# Patient Record
Sex: FEMALE | Race: WHITE | Employment: UNEMPLOYED | URBAN - METROPOLITAN AREA
[De-identification: names, ages, dates, MRNs, and addresses within clinical notes are randomized per-mention and may not be internally consistent; named-entity substitution may affect disease eponyms.]

---

## 2017-09-25 ENCOUNTER — APPOINTMENT (OUTPATIENT)
Dept: PHYSICAL THERAPY | Age: 37
End: 2017-09-25

## 2017-09-28 ENCOUNTER — APPOINTMENT (OUTPATIENT)
Dept: PHYSICAL THERAPY | Age: 37
End: 2017-09-28

## 2017-10-11 ENCOUNTER — HOSPITAL ENCOUNTER (OUTPATIENT)
Dept: PHYSICAL THERAPY | Age: 37
Discharge: HOME | End: 2017-10-11
Payer: MEDICAID

## 2017-10-11 PROCEDURE — 97140 MANUAL THERAPY 1/> REGIONS: CPT

## 2017-10-11 PROCEDURE — 97110 THERAPEUTIC EXERCISES: CPT

## 2017-10-11 PROCEDURE — 97162 PT EVAL MOD COMPLEX 30 MIN: CPT

## 2017-10-11 NOTE — PROGRESS NOTES
PT DAILY TREATMENT NOTE     Patient Name: Avi Cantu  Date:10/11/2017  : 1980  [x]  Patient  Verified  Payor: BLUE CROSS MEDICAID / Plan: [address] [place] [address] / Product Type: Managed Care Medicaid /    In time:2:45  Out time:3:35  Total Treatment Time (min): 50  Visit #: 1 of 16    Treatment Area: Low back pain [M54.5]    SUBJECTIVE  Pain Level (0-10 scale): 710  Any medication changes, allergies to medications, adverse drug reactions, diagnosis change, or new procedure performed?: [x] No    [] Yes (see summary sheet for update)  Subjective functional status/changes:   [] No changes reported  2013- fell down 20 feet down ladder onto concrete, fractured pelvic, and compression fractures of T-spine  X-rays, MRI, TLSO for 6 months, external fixators B wrists  Injections, PT for back, OT for wrists, no surgeries for back  C/o: B SI and B LBP (right> left), intermittent numbness/tingling in B feet (usually in PM when sitting)  Job: unemployed;  job set  Goals: \"I want to minimize the pain in order to go back to work. \"    OBJECTIVE    Modality rationale:    Min Type Additional Details    [] Estim:  []Unatt       []IFC  []Premod                        []Other:  []w/ice   []w/heat  Position:  Location:    [] Estim: []Att    []TENS instruct  []NMES                    []Other:  []w/US   []w/ice   []w/heat  Position:  Location:    []  Traction: [] Cervical       []Lumbar                       [] Prone          []Supine                       []Intermittent   []Continuous Lbs:  [] before manual  [] after manual    []  Ultrasound: []Continuous   [] Pulsed                           []1MHz   []3MHz W/cm2:  Location:    []  Iontophoresis with dexamethasone         Location: [] Take home patch   [] In clinic    []  Ice     []  heat  []  Ice massage  []  Laser   []  Anodyne Position:  Location:    []  Laser with stim  []  Other:  Position:  Location:    []  Vasopneumatic Device Pressure:       [] lo [] med [] hi   Temperature: [] lo [] med [] hi   [] Skin assessment post-treatment:  []intact []redness- no adverse reaction    []redness  adverse reaction:     30 min [x]Eval                  []Re-Eval       10 min Therapeutic Exercise:  [x] See flow sheet :  Added REIL and right roadkill position (to be performed every 3 hours while awake)   Rationale: decrease pain and radiculopathy to improve the patients ability to tolerate positions and normalize ADLs. min Therapeutic Activity:  []  See flow sheet :         min Neuromuscular Re-education:  []  See flow sheet :       10 min Manual Therapy:    METs to correct anteriorly rotated right innominate (successfully corrected)   Rationale: decrease pain and correct joint alignment and joint mechanics to tolerate positions and ADLs. min Gait Training:  ___ feet with ___ device on level surfaces with ___ level of assist   Rationale: With   [] TE   [] TA   [] neuro   [] other: Patient Education: [x] Review HEP    [] Progressed/Changed HEP based on:   [] positioning   [] body mechanics   [] transfers   [] heat/ice application    [] other:      Other Objective/Functional Measures:   See evaluation and plan of care. Pain Level (0-10 scale) post treatment: 5/10    ASSESSMENT/Changes in Function:    Pt reports LBP since falling from 20 feet high ladder in 2013, landing on buttocks causing fracture of pelvis (non-surgical), T-spine compression fractures (non-surgical), multilevel L-spine disk degeneration, and bilateral wrist fractures (treated by external fixators). During PT exam, pt displayed anteriorly rotated right innominate (successfully corrected using METs) and mechanical spine assessment showed a tentative extension bias directional preference and possibly a lateral compartment L-spine derangement.         Patient will continue to benefit from skilled PT services to modify and progress therapeutic interventions, address functional mobility deficits, address ROM deficits, address strength deficits, analyze and address soft tissue restrictions, analyze and cue movement patterns, analyze and modify body mechanics/ergonomics, assess and modify postural abnormalities and instruct in home and community integration to attain remaining goals. []  See Plan of Care  []  See progress note/recertification  []  See Discharge Summary         Progress towards goals / Updated goals:  Short Term Goals: To be accomplished in 3 weeks:    1) Goal: Pt's LBP will decrease to 5/10 at worst and remain at midline of spine in order to better tolerate all ADLs. Status at initial evaluation: pain 10/10 at worst  Current Status: not reassessed     2) Goal: Innominate alignment will remain symmetric and stable in order to decrease pain and improve tolerance for ambulation and ADLs. Status at initial evaluation: anteriorly rotated right innominate (corrected on 10/11/17)  Current Status: not reassessed     3) Goal: Bilateral LEs radicular symptoms (feet numbness) will be centralized to the low back in order to demonstrate effectiveness of directional preference exercises and decrease risk of LE dysfunction. Status at initial evaluation: intermittent bilateral feet numbness/tingling  Current Status: not reassessed     4) Goal: Pt will be independent and compliant with HEP to achieve other goals. Status at initial evaluation: pt is not independent with exercises  Current Status: not reassessed    Long Term Goals: To be accomplished in 8 weeks:    1) Goal: Abolish LBP and radicular symptoms in order to return to normal function. Status at initial evaluation: LBP 10/10 at worst with intermittent bilateral feet numbness/tingling  Current Status: not reassessed     2) Goal: Pt will be able to demonstrate ability to self manage LBP to 2/10 at worst during all activities in order to return to normal function.   Status at initial evaluation: LBP 10/10 at worst  Current Status: not reassessed     PLAN  []  Upgrade activities as tolerated     []  Continue plan of care  []  Update interventions per flow sheet       []  Discharge due to:_  [x]  Other: E-stim, MHP, core/back strengthening, stretches/ROM, METs, postural restoration (JOHANN) principles, [facility]) principles      Maricel RUSS PT 10/11/2017  3:52 PM    Future Appointments  Date Time Provider Rossy Mason   10/17/2017 10:30 AM Maricel RUSS PT [facility]   10/19/2017 11:00 AM [facility]HAEL [facility]

## 2017-10-11 NOTE — PROGRESS NOTES
[facility] in [address] [place] [address] [address]  Phone: [phone] Fax: [phone] of Care/ Statement of Necessity for Physical Therapy Services    Patient name: Marco Newman Start of Care: 10/11/2017   Referral source: Alisa Schmidt MD : 1980    Medical Diagnosis: Low back pain [M54.5] Onset Date: 13    Treatment Diagnosis: decreased positional and functional tolerance, pelvis obliquity/instability, LE radiculopathy, decreased core/back strength/stability   Prior Hospitalization: see medical history Provider#: [id number]   Medications: Verified on Patient summary List    Comorbidities: depression, T-spine compression fracture with Schmorl's nodes, OA, tobacco use   Prior Level of Function: no deficits, independent with ADLs      The Plan of Care and following information is based on the information from the initial evaluation. Assessment/ key information:    Pt reports LBP since falling from 20 feet high ladder in , landing on buttocks causing fracture of pelvis (non-surgical), T-spine compression fractures (non-surgical), multilevel L-spine disk degeneration, and bilateral wrist fractures (treated by external fixators). During PT exam, pt displayed anteriorly rotated right innominate (successfully corrected using METs) and mechanical spine assessment showed a tentative extension bias directional preference and possibly a lateral compartment L-spine derangement. Evaluation Complexity History MEDIUM  Complexity : 1-2 comorbidities / personal factors will impact the outcome/ POC ; Examination MEDIUM Complexity : 3 Standardized tests and measures addressing body structure, function, activity limitation and / or participation in recreation  ;Presentation MEDIUM Complexity : Evolving with changing characteristics  ; Clinical Decision Making MEDIUM Complexity : FOTO score of 26-74  Overall Complexity Rating: MEDIUM  Problem List: pain affecting function, decrease ROM, decrease strength, decrease ADL/ functional abilitiies, decrease activity tolerance and decrease flexibility/ joint mobility   Treatment Plan may include any combination of the following: Therapeutic exercise, Therapeutic activities, Neuromuscular re-education, Physical agent/modality, Manual therapy, Aquatic therapy and Patient education  Patient / Family readiness to learn indicated by: asking questions, trying to perform skills and interest  Persons(s) to be included in education: patient (P)  Barriers to Learning/Limitations: None  Patient Goal (s): \"I want to minimize the pain in order to go back to work. \"  Patient Self Reported Health Status: good  Rehabilitation Potential: good    Short Term Goals: To be accomplished in 3 weeks:    1) Goal: Pt's LBP will decrease to 5/10 at worst and remain at midline of spine in order to better tolerate all ADLs. Status at initial evaluation: pain 10/10 at worst   2) Goal: Innominate alignment will remain symmetric and stable in order to decrease pain and improve tolerance for ambulation and ADLs. Status at initial evaluation: anteriorly rotated right innominate (corrected on 10/11/17)   3) Goal: Bilateral LEs radicular symptoms (feet numbness) will be centralized to the low back in order to demonstrate effectiveness of directional preference exercises and decrease risk of LE dysfunction. Status at initial evaluation: intermittent bilateral feet numbness/tingling   4) Goal: Pt will be independent and compliant with HEP to achieve other goals. Status at initial evaluation: pt is not independent with exercises    Long Term Goals: To be accomplished in 8 weeks:    1) Goal: Abolish LBP and radicular symptoms in order to return to normal function.   Status at initial evaluation: LBP 10/10 at worst with intermittent bilateral feet numbness/tingling   2) Goal: Pt will be able to demonstrate ability to self manage LBP to 2/10 at worst during all activities in order to return to normal function. Status at initial evaluation: LBP 10/10 at worst    Frequency / Duration: Patient to be seen 2 times per week for 8 weeks. Patient/ Caregiver education and instruction: Diagnosis, prognosis, self care, activity modification and exercises, plan of care   [x]  Plan of care has been reviewed with STEFANI De Leon, PT 10/11/2017 3:51 PM    ________________________________________________________________________    I certify that the above Therapy Services are being furnished while the patient is under my care. I agree with the treatment plan and certify that this therapy is necessary. [de-identified] Signature:____________________  Date:____________Time: _________    Please sign and return to [facility] at [facility].  Lunakartik Correa [address]  Phone: [phone] Fax: [phone]

## 2017-10-11 NOTE — PROGRESS NOTES
Physical Therapy Evaluation - Lumbar Spine ([facility])  7/10  June 20, 2013- fell down 20 feet down ladder onto concrete, fractured pelvic, and compression fractures of T-spine  X-rays, MRI, TLSO for 6 months, external fixators B wrists  Injections, PT for back, OT for wrists, no surgeries for back  C/o: B SI and B LBP (right> left), intermittent numbness/tingling in B feet (usually in PM when sitting)  Job: unemployed;  job set  Goals: \"I want to minimize the pain in order to go back to work. \"    SUBJECTIVE  Chief Complaint:    Mechanism of injury:    Symptoms:  Pain rating (0-10): Today:    Best:    Worst:    [] Contstant:    [] Intermittent:     Aggravated by:   [] Bending [] Sitting [] Standing [] Walking   [] Moving [] Cough [] Sneeze [] Valsalva   [] AM  [] PM  Lying:  [] sup   [] pro   [] sidelying   [] Other:     Eased by:    [] Bending [] Sitting [] Standing [] Walking   [] Moving [] AM  [] PM  Lying: [] sup  [] pro  [] sidelying   [] Other:     General Health:  Red Flags Indicated? [] Yes    [] No  [] Yes [] No Recent weight change (If yes, due to dieting?  [] Yes  [] No)   [] Yes [] No Weakness in legs during walking  [] Yes [] No Unremitting pain at night  [] Yes [] No Abdominal pain or problems  [] Yes [] No Rectal bleeding  [] Yes [] No Feet more cold or painful in cold weather  [] Yes [] No Menstrual irregularities  [] Yes [] No Blood or pain with urination  [] Yes [] No Dysfunction of bowel or bladder  [] Yes [] No Recent illness within past 3 weeks (i.e, cold, flu)  [] Yes [] No Numbness/tingling in buttock/genitalia region    Past History/Treatments:     Diagnostic Tests: [] Lab work [] X-rays    [] CT [] MRI     [] Other:  Results:    Functional Status  Prior level of function:  Present functional limitations:  What position do you sleep in?:    OBJECTIVE  Posture:  Lateral Shift: [] R    [] L     [] +  [] -  Kyphosis: [] Increased [] Decreased   []  WNL  Lordosis:  [] Increased [] Decreased   [] WNL  Pelvic symmetry: [] WNL    [] Other:    Gait:  [] Normal     [] Abnormal:    Active Movements: [] N/A   [] Too acute   [] Other:  ROM % AROM % PROM Comments:pain, area   Forward flexion 40-60      Extension 20-30      SB right 20-30      SB left 20-30      Rotation right 5-10      Rotation left 5-10        Repeated Movements   Effects on present pain: produces (TX), abolishes (A), increases (incr), decreases (decr), centralizes (C), peripheral (PH), no effect (NE)   Pre-Test Sx Flexion Repeated Flexion Extension Repeated Extension Repeated SBL Repeated SBR   Sitting 1) LBP (6/10)  4) x10 reps: decreased LBP (5/10)       Standing 2) decreased LBP (5/10)    3) x10 reps: increased LBP (6/10)     Lying 5) supine in hook lying x1 minute: decreased LBP (4/10)  7) prone x1 minute:  decreased LBP (4/10)  6) SKTC x10 reps: increased LBP (5/10) 8) RUTH x1 minute: NE (4/10) 9) x10 reps: increased midline LBP (6/10)    10) right roadkill position: decreased midline LBP to 5/10   Comments:  Side Glide:  Sustained passive positioning test:    Neuro Screen [] WNL  Myotome/Dermatome/Reflexes:  Comments:    Dural Mobility:  SLR Sitting: [] R    [] L    [] +    [] -  @ (degrees):           Supine: [] R    [] L    [] +    [] -  @ (degrees):   Slump Test: [] R    [] L    [] +    [] -  @ (degrees):   Prone Knee Bend: [] R    [] L    [] +    [] -     Palpation  [] Min  [] Mod  [] Severe    Location:  [] Min  [] Mod  [] Severe    Location:  [] Min  [] Mod  [] Severe    Location:    Stabilization Tests  Multifidus Test  Level 1: Prone abdominal draw in (Goal 6-10mmHG):  Level 2: Supported leg load supine (needle deflection at 40mmHG): [] Yes  [] No   Level 3: Unsupported leg load supine (needle deflection at 40mmHG): [] Yes  [] No     Strength   L(0-5) R (0-5) N/T   Hip Flexion (L1,2)   []   Knee Extension (L3,4)   []   Ankle Dorsiflexion (L4)   []   Great Toe Extension (L5)   []   Ankle Plantarflexion (S1)   []   Knee Flexion (S1,2)   []   Upper Abdominals   []   Lower Abdominals   []   Paraspinals   []   Back Rotators   []   Gluteus Marcellus   []   Other   []     Special Tests  Lumbar:  Lumb. Compression: [] Pos  [] Neg               Lumbar Distraction:   [] Pos  [] Neg    Quadrant:  [] Pos  [] Neg   [] Flex  [] Ext    Sacroilliac:  Gaenslen's: [] R    [] L    [] +    [] -     Compression: [] +    [] -     Gapping:  [] +    [] -     Thigh Thrust: [] R    [] L    [] +    [] -     Leg Length: [] +    [] -   Position:    Crests:    ASIS:    PSIS:    Sacral Sulcus:    Mobility: Standing flex:     Sitting flex:     Supine to sit:     Prone knee bend:         Hip: Phong Grieve:  [] R    [] L    [] +    [] -     Scour:  [] R    [] L    [] +    [] -     Piriformis: [] R    [] L    [] +    [] -          Deficits: Rafal's: [] R    [] L    [] +    [] -     Gareth: [] R    [] L    [] +    [] -     Hamstrings 90/90:    Gastrocsoleus (to neutral): Right: Left:       Global Muscular Weakness:  Abdominals:  Quadratus Lumborum:  Paraspinals:   Other:    Other tests/comments:  right LE shortens in sitting- anteriorly rotated right innominate  right standing flexion test

## 2017-10-17 ENCOUNTER — HOSPITAL ENCOUNTER (OUTPATIENT)
Dept: PHYSICAL THERAPY | Age: 37
Discharge: HOME | End: 2017-10-17
Payer: MEDICAID

## 2017-10-17 PROCEDURE — 97112 NEUROMUSCULAR REEDUCATION: CPT

## 2017-10-17 PROCEDURE — 97140 MANUAL THERAPY 1/> REGIONS: CPT

## 2017-10-17 PROCEDURE — 97110 THERAPEUTIC EXERCISES: CPT

## 2017-10-17 NOTE — PROGRESS NOTES
PT DAILY TREATMENT NOTE     Patient Name: Cassy Cason  Date:10/17/2017  : 1980  [x]  Patient  Verified  Payor: BLUE CROSS MEDICAID / Plan: [facility] [employer] / Product Type: Managed Care Medicaid /    In time:10:42  Out time:11:45  Total Treatment Time (min): 63  Visit #: 2 of 16    Treatment Area: Low back pain [M54.5]    SUBJECTIVE  Pain Level (0-10 scale): 7/10  Any medication changes, allergies to medications, adverse drug reactions, diagnosis change, or new procedure performed?: [x] No    [] Yes (see summary sheet for update)  Subjective functional status/changes:   [] No changes reported  \"I did the exercises 2 times a day and sometimes it felt like the exercises made it feel more irrritated. \"    OBJECTIVE    Modality rationale:    Min Type Additional Details    [] Estim:  []Unatt       []IFC  []Premod                        []Other:  []w/ice   []w/heat  Position:  Location:    [] Estim: []Att    []TENS instruct  []NMES                    []Other:  []w/US   []w/ice   []w/heat  Position:  Location:    []  Traction: [] Cervical       []Lumbar                       [] Prone          []Supine                       []Intermittent   []Continuous Lbs:  [] before manual  [] after manual    []  Ultrasound: []Continuous   [] Pulsed                           []1MHz   []3MHz W/cm2:  Location:    []  Iontophoresis with dexamethasone         Location: [] Take home patch   [] In clinic    []  Ice     []  heat  []  Ice massage  []  Laser   []  Anodyne Position:  Location:    []  Laser with stim  []  Other:  Position:  Location:    []  Vasopneumatic Device Pressure:       [] lo [] med [] hi   Temperature: [] lo [] med [] hi   [] Skin assessment post-treatment:  []intact []redness- no adverse reaction    []redness  adverse reaction:      min []Eval                  []Re-Eval       12 min Therapeutic Exercise:  [x] See flow sheet :   Rationale: decrease pain to improve the patients ability to tolerate positions and ADLs. min Therapeutic Activity:  []  See flow sheet :        31 min Neuromuscular Re-education:  [x]  See flow sheet :  Added transverse abdominus (TA) bracing with Swissball #1, #2, #3, added glutes sets, bridges with adduction, prone heel presses   Rationale: increase strength, improve coordination and increase proprioception  to improve the patients ability to tolerate positions and ADLs. 20 min Manual Therapy:    METs to correct posteriorly rotated right innominate (successful), trigger point release right transverse abdominus muscle at anterior iliac crest   Rationale: decrease pain and correct joint alignment and joint mechanics to tolerate positions and ADLs. min Gait Training:  ___ feet with ___ device on level surfaces with ___ level of assist   Rationale: With   [] TE   [] TA   [] neuro   [] other: Patient Education: [x] Review HEP    [] Progressed/Changed HEP based on:   [] positioning   [] body mechanics   [] transfers   [] heat/ice application    [] other:      Other Objective/Functional Measures:   left LE longer in supine and shortens in sitting- posteriorly rotated right innominate  Right standing flexion test     Pain Level (0-10 scale) post treatment: 4/10    ASSESSMENT/Changes in Function:    Pt reported right anterior abdominal pain during transverse abdominus bracing, but trigger point release decreased pain. Right innominate is unstable and required correction for posteriorly rotated mal-alignment today verses anteriorly rotated innominate at visit #1. Exercises that contacted glutes increased right posterior iliac crest pain during active contraction. Extension bias directional preference exercises help relieve pain, but not lower than 4-5/10.     Patient will continue to benefit from skilled PT services to modify and progress therapeutic interventions, address functional mobility deficits, address ROM deficits, address strength deficits, analyze and address soft tissue restrictions, analyze and cue movement patterns, analyze and modify body mechanics/ergonomics, assess and modify postural abnormalities and instruct in home and community integration to attain remaining goals.      []  See Plan of Care  []  See progress note/recertification  []  See Discharge Summary      Progress towards goals / Updated goals:  Short Term Goals: To be accomplished in 3 weeks:                         1) Goal: Pt's LBP will decrease to 5/10 at worst and remain at midline of spine in order to better tolerate all ADLs. Status at initial evaluation: pain 10/10 at worst  Current Status: not reassessed                           2) Goal: Innominate alignment will remain symmetric and stable in order to decrease pain and improve tolerance for ambulation and ADLs. Status at initial evaluation: anteriorly rotated right innominate (corrected on 10/11/17)  Current Status: not reassessed                           3) Goal: Bilateral LEs radicular symptoms (feet numbness) will be centralized to the low back in order to demonstrate effectiveness of directional preference exercises and decrease risk of LE dysfunction. Status at initial evaluation: intermittent bilateral feet numbness/tingling  Current Status: not reassessed                           4) Goal: Pt will be independent and compliant with HEP to achieve other goals. Status at initial evaluation: pt is not independent with exercises  Current Status: not reassessed     Long Term Goals: To be accomplished in 8 weeks:                         1) Goal: Abolish LBP and radicular symptoms in order to return to normal function. Status at initial evaluation: LBP 10/10 at worst with intermittent bilateral feet numbness/tingling  Current Status: not reassessed                           2) Goal: Pt will be able to demonstrate ability to self manage LBP to 2/10 at worst during all activities in order to return to normal function.   Status at initial evaluation: LBP 10/10 at worst  Current Status: not reassessed     PLAN  []  Upgrade activities as tolerated     []  Continue plan of care  []  Update interventions per flow sheet       []  Discharge due to:_  []  Other:_      Nik Mckeon PT 10/17/2017  10:49 AM    Future Appointments  Date Time Provider Rossy Mason   10/19/2017 11:00 AM Nik Mckeon PT [facility]

## 2017-10-19 ENCOUNTER — HOSPITAL ENCOUNTER (OUTPATIENT)
Dept: PHYSICAL THERAPY | Age: 37
Discharge: HOME | End: 2017-10-19
Payer: MEDICAID

## 2017-10-19 PROCEDURE — 97112 NEUROMUSCULAR REEDUCATION: CPT

## 2017-10-19 PROCEDURE — 97110 THERAPEUTIC EXERCISES: CPT

## 2017-10-19 NOTE — PROGRESS NOTES
PT DAILY TREATMENT NOTE     Patient Name: Scout Pederson  Date:10/19/2017  : 1980  [x]  Patient  Verified  Payor: BLUE CROSS MEDICAID / Plan: [facility] HEALTHKEEPERS PLUS / Product Type: Managed Care Medicaid /    In time:11:10  Out time:12:04  Total Treatment Time (min): 47  Visit #: 3 of 16    Treatment Area: Low back pain [M54.5]    SUBJECTIVE  Pain Level (0-10 scale): 5/10  Any medication changes, allergies to medications, adverse drug reactions, diagnosis change, or new procedure performed?: [x] No    [] Yes (see summary sheet for update)  Subjective functional status/changes:   [] No changes reported  \"Pain 8/10 at worst after cutting grass. \"    OBJECTIVE    Modality rationale:    Min Type Additional Details    [] Estim:  []Unatt       []IFC  []Premod                        []Other:  []w/ice   []w/heat  Position:  Location:    [] Estim: []Att    []TENS instruct  []NMES                    []Other:  []w/US   []w/ice   []w/heat  Position:  Location:    []  Traction: [] Cervical       []Lumbar                       [] Prone          []Supine                       []Intermittent   []Continuous Lbs:  [] before manual  [] after manual    []  Ultrasound: []Continuous   [] Pulsed                           []1MHz   []3MHz W/cm2:  Location:    []  Iontophoresis with dexamethasone         Location: [] Take home patch   [] In clinic    []  Ice     []  heat  []  Ice massage  []  Laser   []  Anodyne Position:  Location:    []  Laser with stim  []  Other:  Position:  Location:    []  Vasopneumatic Device Pressure:       [] lo [] med [] hi   Temperature: [] lo [] med [] hi   [] Skin assessment post-treatment:  []intact []redness- no adverse reaction    []redness  adverse reaction:      min []Eval                  []Re-Eval       8 min Therapeutic Exercise:  [x] See flow sheet :   Rationale: decrease pain and radiculopathy to improve the patients ability to tolerate positions and normalize ADLs.      min Therapeutic Activity:  []  See flow sheet :        46 min Neuromuscular Re-education:  [x]  See flow sheet :  Added 90-90 hip lift (JOHANN)   Rationale: increase strength, improve coordination and increase proprioception  to improve the patients ability to tolerate positions and ADLs. min Manual Therapy:          min Gait Training:  ___ feet with ___ device on level surfaces with ___ level of assist   Rationale: With   [] TE   [] TA   [] neuro   [] other: Patient Education: [x] Review HEP    [] Progressed/Changed HEP based on:   [] positioning   [] body mechanics   [] transfers   [] heat/ice application    [] other:      Other Objective/Functional Measures:   Innominates aligned     Pain Level (0-10 scale) post treatment: 4/10    ASSESSMENT/Changes in Function:    Innominates remaining aligned and stable. Pain has decreased to the lowest level to date. Patient will continue to benefit from skilled PT services to modify and progress therapeutic interventions, address functional mobility deficits, address ROM deficits, address strength deficits, analyze and address soft tissue restrictions, analyze and cue movement patterns, analyze and modify body mechanics/ergonomics, assess and modify postural abnormalities and instruct in home and community integration to attain remaining goals.      []  See Plan of Care  []  See progress note/recertification  []  See Discharge Summary      Progress towards goals / Updated goals:  Short Term Goals: To be accomplished in 3 weeks:                         1) Goal: Pt's LBP will decrease to 5/10 at worst and remain at midline of spine in order to better tolerate all ADLs. Status at initial evaluation: pain 10/10 at worst  Current Status: not reassessed                            2) Goal: Innominate alignment will remain symmetric and stable in order to decrease pain and improve tolerance for ambulation and ADLs.   Status at initial evaluation: anteriorly rotated right innominate (corrected on 10/11/17)  Current Status: met (innominates aligned ) 10/19/17                            3) Goal: Bilateral LEs radicular symptoms (feet numbness) will be centralized to the low back in order to demonstrate effectiveness of directional preference exercises and decrease risk of LE dysfunction. Status at initial evaluation: intermittent bilateral feet numbness/tingling  Current Status: not reassessed                            4) Goal: Pt will be independent and compliant with HEP to achieve other goals. Status at initial evaluation: pt is not independent with exercises  Current Status: not reassessed      Long Term Goals: To be accomplished in 8 weeks:                         1) Goal: Abolish LBP and radicular symptoms in order to return to normal function. Status at initial evaluation: LBP 10/10 at worst with intermittent bilateral feet numbness/tingling  Current Status: not reassessed                            2) Goal: Pt will be able to demonstrate ability to self manage LBP to 2/10 at worst during all activities in order to return to normal function. Status at initial evaluation: LBP 10/10 at worst  Current Status: not reassessed     PLAN  []  Upgrade activities as tolerated     [x]  Continue plan of care  []  Update interventions per flow sheet       []  Discharge due to:_  []  Other:_      Kaleigh Vazquez V, PT 10/19/2017  11:14 AM    No future appointments.

## 2017-10-24 ENCOUNTER — APPOINTMENT (OUTPATIENT)
Dept: PHYSICAL THERAPY | Age: 37
End: 2017-10-24
Payer: MEDICAID

## 2017-10-26 ENCOUNTER — HOSPITAL ENCOUNTER (OUTPATIENT)
Dept: PHYSICAL THERAPY | Age: 37
Discharge: HOME | End: 2017-10-26
Payer: MEDICAID

## 2017-10-26 PROCEDURE — 97112 NEUROMUSCULAR REEDUCATION: CPT

## 2017-10-26 PROCEDURE — 97110 THERAPEUTIC EXERCISES: CPT

## 2017-10-26 NOTE — PROGRESS NOTES
PT DAILY TREATMENT NOTE     Patient Name: Luigi Client  Date:10/26/2017  : 1980  [x]  Patient  Verified  Payor: BLUE CROSS MEDICAID / Plan: [facility] HEALTHKEEPERS PLUS / Product Type: Managed Care Medicaid /    In time:9:38  Out time:10:16  Total Treatment Time (min): 38  Visit #: 4 of 16    Treatment Area: Low back pain [M54.5]    SUBJECTIVE  Pain Level (0-10 scale): 5/10  Any medication changes, allergies to medications, adverse drug reactions, diagnosis change, or new procedure performed?: [x] No    [] Yes (see summary sheet for update)  Subjective functional status/changes:   [] No changes reported  \"I definitely feel PT is helping because my pain is not as high as it used to be. \"    OBJECTIVE    Modality rationale:    Min Type Additional Details    [] Estim:  []Unatt       []IFC  []Premod                        []Other:  []w/ice   []w/heat  Position:  Location:    [] Estim: []Att    []TENS instruct  []NMES                    []Other:  []w/US   []w/ice   []w/heat  Position:  Location:    []  Traction: [] Cervical       []Lumbar                       [] Prone          []Supine                       []Intermittent   []Continuous Lbs:  [] before manual  [] after manual    []  Ultrasound: []Continuous   [] Pulsed                           []1MHz   []3MHz W/cm2:  Location:    []  Iontophoresis with dexamethasone         Location: [] Take home patch   [] In clinic    []  Ice     []  heat  []  Ice massage  []  Laser   []  Anodyne Position:  Location:    []  Laser with stim  []  Other:  Position:  Location:    []  Vasopneumatic Device Pressure:       [] lo [] med [] hi   Temperature: [] lo [] med [] hi   [] Skin assessment post-treatment:  []intact []redness- no adverse reaction    []redness  adverse reaction:      min []Eval                  []Re-Eval       9 min Therapeutic Exercise:  [x] See flow sheet :   Rationale: decrease pain and radiculopathy to improve the patients ability to tolerate positions and normalize ADLs. min Therapeutic Activity:  []  See flow sheet :        29 min Neuromuscular Re-education:  [x]  See flow sheet :  Added prone back stabilizations (UE/LE)   Rationale: increase strength, improve coordination and increase proprioception  to improve the patients ability to tolerate positions and ADLs. min Manual Therapy:          min Gait Training:  ___ feet with ___ device on level surfaces with ___ level of assist   Rationale: With   [] TE   [] TA   [] neuro   [] other: Patient Education: [x] Review HEP    [] Progressed/Changed HEP based on:   [] positioning   [] body mechanics   [] transfers   [] heat/ice application    [] other:      Other Objective/Functional Measures:   Innominates aligned. Pain Level (0-10 scale) post treatment: 4/10    ASSESSMENT/Changes in Function:    Innominates remaining aligned and stable. Pain decreasing, but not yet abolished. Pt is over 80% independent with current exercises. Patient will continue to benefit from skilled PT services to modify and progress therapeutic interventions, address functional mobility deficits, address ROM deficits, address strength deficits, analyze and address soft tissue restrictions, analyze and cue movement patterns, analyze and modify body mechanics/ergonomics, assess and modify postural abnormalities and instruct in home and community integration to attain remaining goals.      []  See Plan of Care  []  See progress note/recertification  []  See Discharge Summary      Progress towards goals / Updated goals:  Short Term Goals: To be accomplished in 3 weeks:                         1) Goal: Pt's LBP will decrease to 5/10 at worst and remain at midline of spine in order to better tolerate all ADLs.   Status at initial evaluation: pain 10/10 at worst  Current Status: not reassessed                            2) Goal: Innominate alignment will remain symmetric and stable in order to decrease pain and improve tolerance for ambulation and ADLs. Status at initial evaluation: anteriorly rotated right innominate (corrected on 10/11/17)  Current Status: met (innominates aligned ) 10/26/17                            3) Goal: Bilateral LEs radicular symptoms (feet numbness) will be centralized to the low back in order to demonstrate effectiveness of directional preference exercises and decrease risk of LE dysfunction. Status at initial evaluation: intermittent bilateral feet numbness/tingling  Current Status: not reassessed                            4) Goal: Pt will be independent and compliant with HEP to achieve other goals. Status at initial evaluation: pt is not independent with exercises  Current Status: progressing (pt is 80% independent with current exercises) 10/26/17      Long Term Goals: To be accomplished in 8 weeks:                         1) Goal: Abolish LBP and radicular symptoms in order to return to normal function. Status at initial evaluation: LBP 10/10 at worst with intermittent bilateral feet numbness/tingling  Current Status: not reassessed                            2) Goal: Pt will be able to demonstrate ability to self manage LBP to 2/10 at worst during all activities in order to return to normal function.   Status at initial evaluation: LBP 10/10 at worst  Current Status: not reassessed     PLAN  []  Upgrade activities as tolerated     [x]  Continue plan of care  []  Update interventions per flow sheet       []  Discharge due to:_  []  Other:_      Bessy Stacy PT 10/26/2017  9:58 AM    Future Appointments  Date Time Provider oRssy Mason   10/31/2017 11:00 AM Genie crowell, MICHAEL PERRY THE [facility]   11/2/2017 10:00 AM MICHAEL Vigil THE [facility]

## 2017-10-31 ENCOUNTER — APPOINTMENT (OUTPATIENT)
Dept: PHYSICAL THERAPY | Age: 37
End: 2017-10-31
Payer: MEDICAID

## 2017-11-01 ENCOUNTER — APPOINTMENT (OUTPATIENT)
Dept: PHYSICAL THERAPY | Age: 37
End: 2017-11-01
Payer: MEDICAID

## 2017-11-03 ENCOUNTER — HOSPITAL ENCOUNTER (OUTPATIENT)
Dept: PHYSICAL THERAPY | Age: 37
Discharge: HOME | End: 2017-11-03
Payer: MEDICAID

## 2017-11-03 PROCEDURE — 97112 NEUROMUSCULAR REEDUCATION: CPT

## 2017-11-03 PROCEDURE — 97140 MANUAL THERAPY 1/> REGIONS: CPT

## 2017-11-03 PROCEDURE — 97110 THERAPEUTIC EXERCISES: CPT

## 2017-11-03 NOTE — PROGRESS NOTES
PT DAILY TREATMENT NOTE     Patient Name: Katelyn Villarreal  Date:11/3/2017  : 1980  [x]  Patient  Verified  Payor: BLUE CROSS MEDICAID / Plan: [facility] HEALTHKEEPERS PLUS / Product Type: Managed Care Medicaid /    In time:1035  Out time:1120  Total Treatment Time (min): 45  Visit #: 5 of 16    Treatment Area: Low back pain [M54.5]    SUBJECTIVE  Pain Level (0-10 scale): 6/10  Any medication changes, allergies to medications, adverse drug reactions, diagnosis change, or new procedure performed?: [x] No    [] Yes (see summary sheet for update)  Subjective functional status/changes:   [] No changes reported  \"I have not been working since my fall . Used to work as a . I can barely do what I need to do at home. \"    OBJECTIVE    Modality rationale:    Min Type Additional Details    [] Estim:  []Unatt       []IFC  []Premod                        []Other:  []w/ice   []w/heat  Position:  Location:    [] Estim: []Att    []TENS instruct  []NMES                    []Other:  []w/US   []w/ice   []w/heat  Position:  Location:    []  Traction: [] Cervical       []Lumbar                       [] Prone          []Supine                       []Intermittent   []Continuous Lbs:  [] before manual  [] after manual    []  Ultrasound: []Continuous   [] Pulsed                           []1MHz   []3MHz W/cm2:  Location:    []  Iontophoresis with dexamethasone         Location: [] Take home patch   [] In clinic    []  Ice     [x]  heat  []  Ice massage  []  Laser   []  Anodyne Position:90/90  Location:LS    []  Laser with stim  []  Other:  Position:  Location:    []  Vasopneumatic Device Pressure:       [] lo [] med [] hi   Temperature: [] lo [] med [] hi   [] Skin assessment post-treatment:  []intact []redness- no adverse reaction    []redness  adverse reaction:      min []Eval                  []Re-Eval       20 min Therapeutic Exercise:  [x] See flow sheet :focus on right QL stretching, general strength Rationale: decrease pain and radiculopathy to improve the patients ability to tolerate positions and normalize ADLs. min Therapeutic Activity:  []  See flow sheet :        15 min Neuromuscular Re-education:  [x]  See flow sheet :ES inhibition, LS stabilization     Rationale: increase strength, improve coordination and increase proprioception  to improve the patients ability to tolerate positions and ADLs. 10 min Manual Therapy: functional massage to LB with focus on right QL/ES in left SL and prone        min Gait Training:  ___ feet with ___ device on level surfaces with ___ level of assist   Rationale: With   [] TE   [] TA   [] neuro   [] other: Patient Education: [x] Review HEP    [] Progressed/Changed HEP based on:   [] positioning   [] body mechanics   [] transfers   [] heat/ice application    [] other:      Other Objective/Functional Measures:   Innominates aligned  No antalgia with ambulation, transfers etc  Core weakness, trembling noted with QP knee lift, no reported pain     Pain Level (0-10 scale) post treatment: 4/10    ASSESSMENT/Changes in Function:    Innominates remaining aligned and stable. Pain decreasing, but not yet abolished. Pt is over 80% independent with current exercises.     Patient will continue to benefit from skilled PT services to modify and progress therapeutic interventions, address functional mobility deficits, address ROM deficits, address strength deficits, analyze and address soft tissue restrictions, analyze and cue movement patterns, analyze and modify body mechanics/ergonomics, assess and modify postural abnormalities and instruct in home and community integration to attain remaining goals.      [x]  See Plan of Care  []  See progress note/recertification  []  See Discharge Summary      Progress towards goals / Updated goals:  Short Term Goals: To be accomplished in 3 weeks:                         1) Goal: Pt's LBP will decrease to 5/10 at worst and remain at midline of spine in order to better tolerate all ADLs. Status at initial evaluation: pain 10/10 at worst  Current Status: not reassessed                            2) Goal: Innominate alignment will remain symmetric and stable in order to decrease pain and improve tolerance for ambulation and ADLs. Status at initial evaluation: anteriorly rotated right innominate (corrected on 10/11/17)  Current Status: met (innominates aligned ) 10/26/17                            3) Goal: Bilateral LEs radicular symptoms (feet numbness) will be centralized to the low back in order to demonstrate effectiveness of directional preference exercises and decrease risk of LE dysfunction. Status at initial evaluation: intermittent bilateral feet numbness/tingling  Current Status: not reassessed                            4) Goal: Pt will be independent and compliant with HEP to achieve other goals. Status at initial evaluation: pt is not independent with exercises  Current Status: progressing (pt is 80% independent with current exercises) 10/26/17      Long Term Goals: To be accomplished in 8 weeks:                         1) Goal: Abolish LBP and radicular symptoms in order to return to normal function. Status at initial evaluation: LBP 10/10 at worst with intermittent bilateral feet numbness/tingling  Current Status: not reassessed                            2) Goal: Pt will be able to demonstrate ability to self manage LBP to 2/10 at worst during all activities in order to return to normal function.   Status at initial evaluation: LBP 10/10 at worst  Current Status: not reassessed     PLAN  []  Upgrade activities as tolerated     [x]  Continue plan of care  []  Update interventions per flow sheet       []  Discharge due to:_  []  Other:_      Geneva Mello, PT 11/3/2017  9:58 AM    Future Appointments  Date Time Provider Rossy Mason   11/8/2017 11:00 AM Genie crowell, PT [facility]ANY [facility]   11/10/2017 1:00 PM Sterling Quan [facility]   11/14/2017 9:30 AM [facility]   11/16/2017 9:30 AM MICHAEL Kumar THE [facility]

## 2017-11-08 ENCOUNTER — APPOINTMENT (OUTPATIENT)
Dept: PHYSICAL THERAPY | Age: 37
End: 2017-11-08
Payer: MEDICAID

## 2017-11-09 ENCOUNTER — HOSPITAL ENCOUNTER (OUTPATIENT)
Dept: PHYSICAL THERAPY | Age: 37
Discharge: HOME | End: 2017-11-09
Payer: MEDICAID

## 2017-11-09 PROCEDURE — 97530 THERAPEUTIC ACTIVITIES: CPT

## 2017-11-09 PROCEDURE — 97112 NEUROMUSCULAR REEDUCATION: CPT

## 2017-11-09 NOTE — PROGRESS NOTES
PT DAILY TREATMENT NOTE     Patient Name: She Arce  Date:2017  : 1980  [x]  Patient  Verified  Payor: BLUE CROSS MEDICAID / Plan: [facility] HEALTHKEEPERS PLUS / Product Type: Managed Care Medicaid /    In time:240  Out time:320  Total Treatment Time (min): 40  Visit #: 6 of 16    Treatment Area: Low back pain [M54.5]    SUBJECTIVE  Pain Level (0-10 scale): 4/10  Any medication changes, allergies to medications, adverse drug reactions, diagnosis change, or new procedure performed?: [x] No    [] Yes (see summary sheet for update)  Subjective functional status/changes:   [] No changes reported  I am still having pain and it fluctuates depending on what I am doing. There is no day without pain but overall it has improved a little. Current max pain 7/10 with prolonged walking,sweeping, picking up things. \"    OBJECTIVE    Modality rationale:    Min Type Additional Details    [] Estim:  []Unatt       []IFC  []Premod                        []Other:  []w/ice   []w/heat  Position:  Location:    [] Estim: []Att    []TENS instruct  []NMES                    []Other:  []w/US   []w/ice   []w/heat  Position:  Location:    []  Traction: [] Cervical       []Lumbar                       [] Prone          []Supine                       []Intermittent   []Continuous Lbs:  [] before manual  [] after manual    []  Ultrasound: []Continuous   [] Pulsed                           []1MHz   []3MHz W/cm2:  Location:    []  Iontophoresis with dexamethasone         Location: [] Take home patch   [] In clinic    []  Ice     [x]  heat  []  Ice massage  []  Laser   []  Anodyne Position:9090  Location:LS    []  Laser with stim  []  Other:  Position:  Location:    []  Vasopneumatic Device Pressure:       [] lo [] med [] hi   Temperature: [] lo [] med [] hi   [] Skin assessment post-treatment:  []intact []redness- no adverse reaction    []redness  adverse reaction:      min []Eval                  []Re-Eval        min Therapeutic Exercise:  [x] See flow sheet :       25 min Therapeutic Activity:  [x]  See flow sheet :Reevaluation, review of proper body mechanics with box lifting with verbal cues, review of need to use proper body mechanics with all housekeeping activities,         15 min Neuromuscular Re-education:  [x]  See flow sheet :ES inhibition, LS stabilization, core strength     Rationale: increase strength, improve coordination and increase proprioception  to improve the patients ability to tolerate positions and ADLs. min Manual Therapy:         min Gait Training:  ___ feet with ___ device on level surfaces with ___ level of assist   Rationale: With   [] TE   [] TA   [] neuro   [] other: Patient Education: [x] Review HEP    [] Progressed/Changed HEP based on:   [] positioning   [] body mechanics   [] transfers   [] heat/ice application    [] other:      Other Objective/Functional Measures:   Innominates aligned  Habitual poor body mechanics, ability to perform floor to waist lifting  Improved understanding of mechanics after repeated squatting to roll swiss ball for habituation  Core weakness, trembling noted with QP knee lift, no reported pain  Difficulty with all core ex due to deficits in kinesthetic awareness and core strength  SLR to 80 deg without reported LE pain     Pain Level (0-10 scale) post treatment: 2-3/10    ASSESSMENT/Changes in Function:    Innominates remaining aligned and stable.   Pain decreasing but continues with deficits in body mechanics and core strength    Patient will continue to benefit from skilled PT services to modify and progress therapeutic interventions, address functional mobility deficits, address ROM deficits, address strength deficits, analyze and address soft tissue restrictions, analyze and cue movement patterns, analyze and modify body mechanics/ergonomics, assess and modify postural abnormalities and instruct in home and community integration to attain remaining goals.      [x]  See Plan of Care  [x]  See progress note/recertification  []  See Discharge Summary      Progress towards goals / Updated goals:mrs. Rehman has been showing slow improvement in pain levels with max pain at 7/10 but reports still having a fair amount of pain depending on her activities. She has been using poor body mechanics with ADL/housekeeping and has been instructed in proper mechanics to increase awareness and self correction. Her core strength has slightly improved but she continues to be challenged with advanced core activities. I recommend to continue with current treatment to address primary deficit of core strength and body mechanics. Short Term Goals: To be accomplished in 3 weeks:                         7) Goal: Pt's LBP will decrease to 5/10 at worst and remain at midline of spine in order to better tolerate all ADLs. Status at initial evaluation: pain 10/10 at worst  Current Status:max pain 7/10, progressing                            2) Goal: Innominate alignment will remain symmetric and stable in order to decrease pain and improve tolerance for ambulation and ADLs. Status at initial evaluation: anteriorly rotated right innominate (corrected on 10/11/17)  Current Status: met (innominates aligned ) 10/26/17                            3) Goal: Bilateral LEs radicular symptoms (feet numbness) will be centralized to the low back in order to demonstrate effectiveness of directional preference exercises and decrease risk of LE dysfunction. Status at initial evaluation: intermittent bilateral feet numbness/tingling  Current Status: intermittent numbness in 2nd-5th toes, unsure if related to LS dysfunction,                             5) Goal: Pt will be independent and compliant with HEP to achieve other goals.   Status at initial evaluation: pt is not independent with exercises  Current Status: progressing (pt is 80% independent with current exercises) 10/26/17  [facility] 280 be accomplished in 8 weeks:                         7) Goal: Abolish LBP and radicular symptoms in order to return to normal function. Status at initial evaluation: LBP 10/10 at worst with intermittent bilateral feet numbness/tingling  Current Status: max pain reduced to 7/10 with ambulation, sweeping, bending                            6) Goal: Pt will be able to demonstrate ability to self manage LBP to 2/10 at worst during all activities in order to return to normal function. Status at initial evaluation: LBP 10/10 at worst  Current Status: not reassessed    3) Updated goal as of 11/9/17: Patient using proper body mechanics with all material handling to minimize spinal loading and reduce pain.   Current status as of 11/9/17: habitual poor body mechanics with floor to waist lifting and sweeping motion     PLAN  []  Upgrade activities as tolerated     [x]  Continue plan of care with focus on proper body mechanics and core strength  []  Update interventions per flow sheet       []  Discharge due to:_  []  Other:_      Eboni Jean-Baptiste PT 11/9/2017  9:58 AM    Future Appointments  Date Time Provider Rossy Mason   11/10/2017 1:00 PM MICHAEL Landis [facility]   11/14/2017 9:30 AM MICHAEL Goel [facility]   11/16/2017 9:30 AM MICHAEL Landis [facility]

## 2017-11-09 NOTE — PROGRESS NOTES
[facility] in [address] [place], [address] [place]  Phone: [phone]      Fax:  [phone]    Progress Note  Patient name: Gene Ruvalcaba Start of Care: 10/11/2017   Referral source: Sharlene Daniel MD : 1980                         Medical Diagnosis: Low back pain [M54.5] Onset Date: 13                         Treatment Diagnosis: decreased positional and functional tolerance, pelvis obliquity/instability, LE radiculopathy, decreased core/back strength/stability   Prior Hospitalization: see medical history Provider#: [id number]   Medications: Verified on Patient summary List    Comorbidities: depression, T-spine compression fracture with Schmorl's nodes, OA, tobacco use   Prior Level of Function: no deficits, independent with ADLs      Visits from Start of Care: 6    Missed Visits: 2        Progress towards goals / Updated goals:mrs. Rehman has been showing slow improvement in pain levels with max pain at 7/10 but reports still having a fair amount of pain depending on her activities. She has been using poor body mechanics with ADL/housekeeping and has been instructed in proper mechanics to increase awareness and self correction. Her core strength has slightly improved but she continues to be challenged with advanced core activities. I recommend to continue with current treatment to address primary deficit of core strength and body mechanics. Short Term Goals: To be accomplished in 3 weeks:                         9) Goal: Pt's LBP will decrease to 5/10 at worst and remain at midline of spine in order to better tolerate all ADLs. Status at initial evaluation: pain 10/10 at worst  Current Status:max pain 7/10, progressing                            2) Goal: Innominate alignment will remain symmetric and stable in order to decrease pain and improve tolerance for ambulation and ADLs.   Status at initial evaluation: anteriorly rotated right innominate (corrected on 10/11/17)  Current Status: met (innominates aligned ) 10/26/17                            3) Goal: Bilateral LEs radicular symptoms (feet numbness) will be centralized to the low back in order to demonstrate effectiveness of directional preference exercises and decrease risk of LE dysfunction. Status at initial evaluation: intermittent bilateral feet numbness/tingling  Current Status: intermittent numbness in 2nd-5th toes, unsure if related to LS dysfunction,                             2) Goal: Pt will be independent and compliant with HEP to achieve other goals. Status at initial evaluation: pt is not independent with exercises  Current Status: progressing (pt is 80% independent with current exercises) 10/26/17      Long Term Goals: To be accomplished in 8 weeks:                         1) Goal: Abolish LBP and radicular symptoms in order to return to normal function. Status at initial evaluation: LBP 10/10 at worst with intermittent bilateral feet numbness/tingling  Current Status: max pain reduced to 7/10 with ambulation, sweeping, bending                            8) Goal: Pt will be able to demonstrate ability to self manage LBP to 2/10 at worst during all activities in order to return to normal function. Status at initial evaluation: LBP 10/10 at worst  Current Status: not reassessed    3) Updated goal as of 11/9/17: Patient using proper body mechanics with all material handling to minimize spinal loading and reduce pain.   Current status as of 11/9/17: habitual poor body mechanics with floor to waist lifting and sweeping motion  Key Functional Changes: improved core strength     ASSESSMENT/RECOMMENDATIONS:  [x]Continue therapy per initial plan/protocol at a frequency of  2 x per week for 8 weeks  []Continue therapy with the following recommended changes:_____________________      _____________________________________________________________________  []Discontinue therapy progressing towards or have reached established goals  []Discontinue therapy due to lack of appreciable progress towards goals  []Discontinue therapy due to lack of attendance or compliance  []Await Physician's recommendations/decisions regarding therapy  []Other:________________________________________________________________    Thank you for this referral.   Geneva Mello, PT 11/9/2017 3:30 PM  NOTE TO PHYSICIAN:  PLEASE COMPLETE THE ORDERS BELOW AND   FAX TO [facility]: [phone]  If you are unable to process this request in 24 hours please contact our office:   [phone]  []  I have read the above report and request that my patient continue as recommended. []  I have read the above report and request that my patient continue therapy with the following changes/special instructions:________________________________________  []I have read the above report and request that my patient be discharged from therapy.     Physicians signature: ______________________________Date: ______Time:______

## 2017-11-10 ENCOUNTER — APPOINTMENT (OUTPATIENT)
Dept: PHYSICAL THERAPY | Age: 37
End: 2017-11-10
Payer: MEDICAID

## 2017-11-14 ENCOUNTER — HOSPITAL ENCOUNTER (OUTPATIENT)
Dept: PHYSICAL THERAPY | Age: 37
Discharge: HOME | End: 2017-11-14
Payer: MEDICAID

## 2017-11-14 PROCEDURE — 97110 THERAPEUTIC EXERCISES: CPT

## 2017-11-14 PROCEDURE — 97112 NEUROMUSCULAR REEDUCATION: CPT

## 2017-11-14 NOTE — PROGRESS NOTES
PT DAILY TREATMENT NOTE - [facility]     Patient Name: Rola Show  Date:2017  : 1980  [x]  Patient  Verified  Payor: BLUE CROSS MEDICAID / Plan: [facility] [employer] HEALTHKEEPERS PLUS / Product Type: Managed Care Medicaid /    In time:9:33  Out time:10:35  Total Treatment Time (min): 62  Visit #: 7  16    Treatment Area: Low back pain [M54.5]    SUBJECTIVE  Pain Level (0-10 scale): 5/10  Any medication changes, allergies to medications, adverse drug reactions, diagnosis change, or new procedure performed?: [x] No    [] Yes (see summary sheet for update)  Subjective functional status/changes:   [] No changes reported  \"I've been feeling pretty good. Pain 5/10 at worst, 3/10 at best; I'm 75% better. \"    OBJECTIVE    Modality rationale:    Min Type Additional Details    [] Estim:  []Unatt       []IFC  []Premod                        []Other:  []w/ice   []w/heat  Position:  Location:    [] Estim: []Att    []TENS instruct  []NMES                    []Other:  []w/US   []w/ice   []w/heat  Position:  Location:    []  Traction: [] Cervical       []Lumbar                       [] Prone          []Supine                       []Intermittent   []Continuous Lbs:  [] before manual  [] after manual    []  Ultrasound: []Continuous   [] Pulsed                           []1MHz   []3MHz W/cm2:  Location:    []  Iontophoresis with dexamethasone         Location: [] Take home patch   [] In clinic    []  Ice     []  heat  []  Ice massage  []  Laser   []  Anodyne Position:  Location:    []  Laser with stim  []  Other:  Position:  Location:    []  Vasopneumatic Device Pressure:       [] lo [] med [] hi   Temperature: [] lo [] med [] hi   [] Skin assessment post-treatment:  []intact []redness- no adverse reaction    []redness  adverse reaction:      min []Eval                  []Re-Eval       12 min Therapeutic Exercise:  [x] See flow sheet :  Added REIL using pelvic strap   Rationale: decrease pain and radiculopathy to improve the patients ability to tolerate positions and normalize ADLs. min Therapeutic Activity:  []  See flow sheet :        50 min Neuromuscular Re-education:  [x]  See flow sheet :  Progressed bridges to [employer] bridges, added quadriped back stabilizations (UE/LE)   Rationale: increase strength, improve coordination and increase proprioception  to improve the patients ability to tolerate positions and ADLs. min Manual Therapy:          min Gait Training:  ___ feet with ___ device on level surfaces with ___ level of assist   Rationale: With   [] TE   [] TA   [] neuro   [] other: Patient Education: [x] Review HEP    [] Progressed/Changed HEP based on:   [] positioning   [] body mechanics   [] transfers   [] heat/ice application    [] other:      Other Objective/Functional Measures:   Innominates aligned. Pain Level (0-10 scale) post treatment: 4/10    ASSESSMENT/Changes in Function:    Pt's innominates remain aligned and stable. Pt's pain has been consistently mild to moderate, but still constant and does not decrease lower than 3/10. Pt reports a 75% decrease of pain and improvement of function. Pt is also progressing toward independence with exercises. Patient will continue to benefit from skilled PT services to modify and progress therapeutic interventions, address functional mobility deficits, address ROM deficits, address strength deficits, analyze and address soft tissue restrictions, analyze and cue movement patterns, analyze and modify body mechanics/ergonomics, assess and modify postural abnormalities and instruct in home and community integration to attain remaining goals.      []  See Plan of Care  []  See progress note/recertification  []  See Discharge Summary         Progress towards goals / Updated goals:  Short Term Goals: To be accomplished in 3 weeks:                         9) Goal: Pt's LBP will decrease to 5/10 at worst and remain at midline of spine in order to better tolerate all ADLs. Status at initial evaluation: pain 10/10 at worst  Status at last progress note (11/09/17): max pain 7/10, progressing  Current status: not reassessed                            2) Goal: Innominate alignment will remain symmetric and stable in order to decrease pain and improve tolerance for ambulation and ADLs. Status at initial evaluation: anteriorly rotated right innominate (corrected on 10/11/17)  Status at last progress note (11/09/17): met (innominates aligned ) 10/26/17  Current status: met (innominates aligned) 11/14/17                            3) Goal: Bilateral LEs radicular symptoms (feet numbness) will be centralized to the low back in order to demonstrate effectiveness of directional preference exercises and decrease risk of LE dysfunction. Status at initial evaluation: intermittent bilateral feet numbness/tingling  Status at last progress note (11/09/17): intermittent numbness in 2nd-5th toes, unsure if related to LS dysfunction,   Current status: not reassessed                          4) Goal: Pt will be independent and compliant with HEP to achieve other goals. Status at initial evaluation: pt is not independent with exercises  Status at last progress note (11/09/17): progressing (pt is 80% independent with current exercises) 10/26/17  Current status: not reassessed      Long Term Goals: To be accomplished in 8 weeks:                         1) Goal: Abolish LBP and radicular symptoms in order to return to normal function. Status at initial evaluation: LBP 10/10 at worst with intermittent bilateral feet numbness/tingling  Status at last progress note (11/09/17): max pain reduced to 7/10 with ambulation, sweeping, bending  Current status: not reassessed                            2) Goal: Pt will be able to demonstrate ability to self manage LBP to 2/10 at worst during all activities in order to return to normal function.   Status at initial evaluation: LBP 10/10 at worst  Status at last progress note (11/09/17): not reassessed  Current status: not reassessed       3) Updated goal as of 11/9/17: Patient using proper body mechanics with all material handling to minimize spinal loading and reduce pain.   Status at last progress note (11/09/17): habitual poor body mechanics with floor to waist lifting and sweeping motion  Current status: not reassessed    PLAN  []  Upgrade activities as tolerated     [x]  Continue plan of care  []  Update interventions per flow sheet       []  Discharge due to:_  []  Other:_      Charles Mukherjee PT 11/14/2017  10:56 AM    Future Appointments  Date Time Provider Rossy Mason   11/16/2017 9:30 AM MICHAEL Perez [facility]   11/20/2017 2:00 PM MICHAEL Tolentino [facility]   11/22/2017 11:00 AM MICHAEL Tolentino [facility]

## 2017-11-16 ENCOUNTER — HOSPITAL ENCOUNTER (OUTPATIENT)
Dept: PHYSICAL THERAPY | Age: 37
Discharge: HOME | End: 2017-11-16
Payer: MEDICAID

## 2017-11-16 PROCEDURE — 97014 ELECTRIC STIMULATION THERAPY: CPT

## 2017-11-16 PROCEDURE — 97140 MANUAL THERAPY 1/> REGIONS: CPT

## 2017-11-16 PROCEDURE — 97112 NEUROMUSCULAR REEDUCATION: CPT

## 2017-11-16 NOTE — PROGRESS NOTES
PT DAILY TREATMENT NOTE     Patient Name: Dacia Catalan  Date:2017  : 1980  [x]  Patient  Verified  Payor: BLUE CROSS MEDICAID / Plan: [facility] [employer] / Product Type: Managed Care Medicaid /    In time:9:35  Out time:10:40  Total Treatment Time (min): 65  Visit #: 8 of 16    Treatment Area: Low back pain [M54.5]    SUBJECTIVE  Pain Level (0-10 scale): 610  Any medication changes, allergies to medications, adverse drug reactions, diagnosis change, or new procedure performed?: [x] No    [] Yes (see summary sheet for update)  Subjective functional status/changes:   [] No changes reported  \"I was standing up making dinner and I got this spasm on my right side back. I still feel it even though it has improved. OBJECTIVE    Modality rationale: decrease pain and decrease spasm to improve the patients ability to tolerate positions and ADLs.    Min Type Additional Details   20 [x] Estim:  [x]Unatt       []IFC  [x]Premod (1-10 pps)                        [x]Other: 5 minutes set up []w/ice   [x]w/heat  Position: prone  Location: applied to L-spine    [] Estim: []Att    []TENS instruct  []NMES                    []Other:  []w/US   []w/ice   []w/heat  Position:  Location:    []  Traction: [] Cervical       []Lumbar                       [] Prone          []Supine                       []Intermittent   []Continuous Lbs:  [] before manual  [] after manual    []  Ultrasound: []Continuous   [] Pulsed                           []1MHz   []3MHz W/cm2:  Location:    []  Iontophoresis with dexamethasone         Location: [] Take home patch   [] In clinic    []  Ice     []  heat  []  Ice massage  []  Laser   []  Anodyne Position:  Location:    []  Laser with stim  []  Other:  Position:  Location:    []  Vasopneumatic Device Pressure:       [] lo [] med [] hi   Temperature: [] lo [] med [] hi   [] Skin assessment post-treatment:  []intact []redness- no adverse reaction    []redness  adverse reaction:      min []Eval                  []Re-Eval        min Therapeutic Exercise:  [] See flow sheet :        min Therapeutic Activity:  []  See flow sheet :        35 min Neuromuscular Re-education:  [x]  See flow sheet :   Rationale: increase strength, improve coordination and increase proprioception  to improve the patients ability to tolerate positions and ADLs. 10 min Manual Therapy:    Trigger point release right T/L-spine paraspinals        min Gait Training:  ___ feet with ___ device on level surfaces with ___ level of assist   Rationale: With   [] TE   [] TA   [] neuro   [] other: Patient Education: [x] Review HEP    [] Progressed/Changed HEP based on:   [] positioning   [] body mechanics   [] transfers   [] heat/ice application    [] other:      Other Objective/Functional Measures:   Mild-moderate muscle spasm right  Innominates aligned    Pain Level (0-10 scale) post treatment: 5/10    ASSESSMENT/Changes in Function:    Increased muscle spasm of the right T/L-spine presenet and causing increased pain for unknown reason. Innominates remaining aligned and stable. Patient will continue to benefit from skilled PT services to modify and progress therapeutic interventions, address functional mobility deficits, address ROM deficits, address strength deficits, analyze and address soft tissue restrictions, analyze and cue movement patterns, analyze and modify body mechanics/ergonomics, assess and modify postural abnormalities and instruct in home and community integration to attain remaining goals.      []  See Plan of Care  []  See progress note/recertification  []  See Discharge Summary      Progress towards goals / Updated goals:  Short Term Goals: To be accomplished in 3 weeks:                         0) Goal: Pt's LBP will decrease to 5/10 at worst and remain at midline of spine in order to better tolerate all ADLs.   Status at initial evaluation: pain 10/10 at worst  Status at last progress note (11/09/17): max pain 7/10, progressing  Current status: not reassessed                            2) Goal: Innominate alignment will remain symmetric and stable in order to decrease pain and improve tolerance for ambulation and ADLs. Status at initial evaluation: anteriorly rotated right innominate (corrected on 10/11/17)  Status at last progress note (11/09/17): met (innominates aligned ) 10/26/17  Current status: met (innominates aligned) 11/14/17                            3) Goal: Bilateral LEs radicular symptoms (feet numbness) will be centralized to the low back in order to demonstrate effectiveness of directional preference exercises and decrease risk of LE dysfunction. Status at initial evaluation: intermittent bilateral feet numbness/tingling  Status at last progress note (11/09/17): intermittent numbness in 2nd-5th toes, unsure if related to LS dysfunction,   Current status: not reassessed                           4) Goal: Pt will be independent and compliant with HEP to achieve other goals. Status at initial evaluation: pt is not independent with exercises  Status at last progress note (11/09/17): progressing (pt is 80% independent with current exercises) 10/26/17  Current status: not reassessed      Long Term Goals: To be accomplished in 8 weeks:                         1) Goal: Abolish LBP and radicular symptoms in order to return to normal function. Status at initial evaluation: LBP 10/10 at worst with intermittent bilateral feet numbness/tingling  Status at last progress note (11/09/17): max pain reduced to 7/10 with ambulation, sweeping, bending  Current status: not reassessed                            2) Goal: Pt will be able to demonstrate ability to self manage LBP to 2/10 at worst during all activities in order to return to normal function.   Status at initial evaluation: LBP 10/10 at worst  Status at last progress note (11/09/17): not reassessed  Current status: not reassessed                                                  3) Updated goal as of 11/9/17: Patient using proper body mechanics with all material handling to minimize spinal loading and reduce pain.   Status at last progress note (11/09/17): habitual poor body mechanics with floor to waist lifting and sweeping motion  Current status: not reassessed     PLAN  []  Upgrade activities as tolerated     [x]  Continue plan of care  []  Update interventions per flow sheet       []  Discharge due to:_  []  Other:_      Hazel Carbajal PT 11/16/2017  10:05 AM    Future Appointments  Date Time Provider Rossy Mason   11/20/2017 2:00 PM MICHAEL Byrd [facility]   11/22/2017 11:00 AM MICHAEL Byrd [facility]

## 2017-11-20 ENCOUNTER — HOSPITAL ENCOUNTER (OUTPATIENT)
Dept: PHYSICAL THERAPY | Age: 37
Discharge: HOME | End: 2017-11-20
Payer: MEDICAID

## 2017-11-20 PROCEDURE — 97140 MANUAL THERAPY 1/> REGIONS: CPT

## 2017-11-20 PROCEDURE — 97112 NEUROMUSCULAR REEDUCATION: CPT

## 2017-11-20 NOTE — PROGRESS NOTES
PT DAILY TREATMENT NOTE     Patient Name: Khai Gutierrez  Date:2017  : 1980  [x]  Patient  Verified  Payor: BLUE CROSS MEDICAID / Plan: [facility] HEALTHKEEPERS PLUS / Product Type: Managed Care Medicaid /    In time:2:05  Out time:3:02  Total Treatment Time (min): 62  Visit #: 9 of 16    Treatment Area: Low back pain [M54.5]    SUBJECTIVE  Pain Level (0-10 scale): 4/10  Any medication changes, allergies to medications, adverse drug reactions, diagnosis change, or new procedure performed?: [x] No    [] Yes (see summary sheet for update)  Subjective functional status/changes:   [] No changes reported  \"Pain 7-8/10 at worst while    OBJECTIVE    Modality rationale:    Min Type Additional Details    [] Estim:  []Unatt       []IFC  []Premod                        []Other:  []w/ice   []w/heat  Position:  Location:    [] Estim: []Att    []TENS instruct  []NMES                    []Other:  []w/US   []w/ice   []w/heat  Position:  Location:    []  Traction: [] Cervical       []Lumbar                       [] Prone          []Supine                       []Intermittent   []Continuous Lbs:  [] before manual  [] after manual    []  Ultrasound: []Continuous   [] Pulsed                           []1MHz   []3MHz W/cm2:  Location:    []  Iontophoresis with dexamethasone         Location: [] Take home patch   [] In clinic    []  Ice     []  heat  []  Ice massage  []  Laser   []  Anodyne Position:  Location:    []  Laser with stim  []  Other:  Position:  Location:    []  Vasopneumatic Device Pressure:       [] lo [] med [] hi   Temperature: [] lo [] med [] hi   [] Skin assessment post-treatment:  []intact []redness- no adverse reaction    []redness  adverse reaction:      min []Eval                  []Re-Eval        min Therapeutic Exercise:  [] See flow sheet :        min Therapeutic Activity:  []  See flow sheet :        48 min Neuromuscular Re-education:  [x]  See flow sheet :   Rationale: increase strength, improve coordination and increase proprioception  to improve the patients ability to tolerate positions and ADLs. 10 min Manual Therapy:    Self METs to correct anteriorly rotated right innominate   Rationale: decrease pain and correct joint alignment and joint mechanics to tolerate positions and ADLs. min Gait Training:  ___ feet with ___ device on level surfaces with ___ level of assist   Rationale: With   [] TE   [] TA   [] neuro   [] other: Patient Education: [x] Review HEP    [] Progressed/Changed HEP based on:   [] positioning   [] body mechanics   [] transfers   [] heat/ice application    [] other:      Other Objective/Functional Measures:   Sittin/10  SKTC x10 reps: NE (5/10)  RFISitting x10 reps: decreased LBP (3/10)  left LE lengthens in sitting- anteriorly rotated right innominate    Pain Level (0-10 scale) post treatment: 3/10    ASSESSMENT/Changes in Function:    Pt's innominates required correction again, but innominates were in the same position as at initial evaluation. Innominates were able to be self corrected by pt using self METs. Pain decreased immediately after correcting innominate alignment. Patient will continue to benefit from skilled PT services to modify and progress therapeutic interventions, address functional mobility deficits, address ROM deficits, address strength deficits, analyze and address soft tissue restrictions, analyze and cue movement patterns, analyze and modify body mechanics/ergonomics, assess and modify postural abnormalities and instruct in home and community integration to attain remaining goals.      []  See Plan of Care  []  See progress note/recertification  []  See Discharge Summary      Progress towards goals / Updated goals:  Short Term Goals: To be accomplished in 3 weeks:                         1) Goal: Pt's LBP will decrease to 5/10 at worst and remain at midline of spine in order to better tolerate all ADLs.   Status at initial evaluation: pain 10/10 at worst  Status at last progress note (11/09/17): max pain 7/10, progressing  Current status: not reassessed                            2) Goal: Innominate alignment will remain symmetric and stable in order to decrease pain and improve tolerance for ambulation and ADLs. Status at initial evaluation: anteriorly rotated right innominate (corrected on 10/11/17)  Status at last progress note (11/09/17): met (innominates aligned ) 10/26/17  Current status: not met (anteriorly rotated right innominate corrected on 11/20/17) 11/20/17                          3) Goal: Bilateral LEs radicular symptoms (feet numbness) will be centralized to the low back in order to demonstrate effectiveness of directional preference exercises and decrease risk of LE dysfunction. Status at initial evaluation: intermittent bilateral feet numbness/tingling  Status at last progress note (11/09/17): intermittent numbness in 2nd-5th toes, unsure if related to LS dysfunction,   Current status: not reassessed                            4) Goal: Pt will be independent and compliant with HEP to achieve other goals. Status at initial evaluation: pt is not independent with exercises  Status at last progress note (11/09/17): progressing (pt is 80% independent with current exercises) 10/26/17  Current status: not reassessed      Long Term Goals: To be accomplished in 8 weeks:                         1) Goal: Abolish LBP and radicular symptoms in order to return to normal function. Status at initial evaluation: LBP 10/10 at worst with intermittent bilateral feet numbness/tingling  Status at last progress note (11/09/17): max pain reduced to 7/10 with ambulation, sweeping, bending  Current status: not reassessed                            2) Goal: Pt will be able to demonstrate ability to self manage LBP to 2/10 at worst during all activities in order to return to normal function.   Status at initial evaluation: LBP 10/10 at worst  Status at last progress note (11/09/17): not reassessed  Current status: not reassessed                          3) Updated goal as of 11/9/17: Patient using proper body mechanics with all material handling to minimize spinal loading and reduce pain.   Status at last progress note (11/09/17): habitual poor body mechanics with floor to waist lifting and sweeping motion  Current status: not reassessed    PLAN  []  Upgrade activities as tolerated     [x]  Continue plan of care  []  Update interventions per flow sheet       []  Discharge due to:_  []  Other:_      Nguyen Garrison PT 11/20/2017  2:32 PM    Future Appointments  Date Time Provider Rossy Mason   11/22/2017 11:00 AM Nguyen Garrison PT [facility]

## 2017-11-22 ENCOUNTER — APPOINTMENT (OUTPATIENT)
Dept: PHYSICAL THERAPY | Age: 37
End: 2017-11-22
Payer: MEDICAID

## 2017-12-19 ENCOUNTER — HOSPITAL ENCOUNTER (OUTPATIENT)
Dept: PHYSICAL THERAPY | Age: 37
Discharge: HOME | End: 2017-12-19
Payer: MEDICAID

## 2017-12-19 PROCEDURE — 97530 THERAPEUTIC ACTIVITIES: CPT

## 2017-12-19 PROCEDURE — 97112 NEUROMUSCULAR REEDUCATION: CPT

## 2017-12-19 PROCEDURE — 97110 THERAPEUTIC EXERCISES: CPT

## 2017-12-19 NOTE — PROGRESS NOTES
[facility] in [address] [place], [address] [place]  Phone: [phone]      Fax:  [phone]    Progress Note  Patient name: Olaf Guevara Start of Care: 10/11/2017   Referral source: Steve Morataya MD : 1980                         Medical Diagnosis: Low back pain [M54.5] Onset Date: 13                         Treatment Diagnosis: decreased positional and functional tolerance, pelvis obliquity/instability, LE radiculopathy, decreased core/back strength/stability   Prior Hospitalization: see medical history Provider#: [id number]   Medications: Verified on Patient summary List    Comorbidities: depression, T-spine compression fracture with Schmorl's nodes, OA, tobacco use   Prior Level of Function: no deficits, independent with ADLs      Visits from Start of Care: 10 of 16    Missed Visits: 4        Progress towards goals / Updated goals:Overall patient's progress has been slow and she has been unable to perform her HEP consistently due to a recent relocation. She currently reports fluctuating levels of pain in her LS and presents with residual deficits in postural alignment and core strength/LS stability. She has met 2 goals. I recommend continuation of current treatment. Short Term Goals: To be accomplished in 3 weeks:                         4) Goal: Pt's LBP will decrease to 5/10 at worst and remain at midline of spine in order to better tolerate all ADLs. Status at initial evaluation: pain 10/10 at worst  Status at last progress note (17): max pain 7/10, progressing  Current status: max pain 7-8/10, no improvement                            2) Goal: Innominate alignment will remain symmetric and stable in order to decrease pain and improve tolerance for ambulation and ADLs.   Status at initial evaluation: anteriorly rotated right innominate (corrected on 10/11/17)  Status at last progress note (17): met (innominates aligned ) 10/26/17  Current status: good alignment, goal met                          3) Goal: Bilateral LEs radicular symptoms (feet numbness) will be centralized to the low back in order to demonstrate effectiveness of directional preference exercises and decrease risk of LE dysfunction. Status at initial evaluation: intermittent bilateral feet numbness/tingling  Status at last progress note (11/09/17): intermittent numbness in 2nd-5th toes, unsure if related to LS dysfunction,   Current status: still reporting  Numbness in digits 3/4 both feet intermittently, likely not related to LBP, negative SLR, no radicular symptoms, goal met                            7) Goal: Pt will be independent and compliant with HEP to achieve other goals. Status at initial evaluation: pt is not independent with exercises  Status at last progress note (11/09/17): progressing (pt is 80% independent with current exercises) 10/26/17  Current status: patient has been sick lately and due to move has been unable to perform HEP on a regular basis, progressing  [address] be accomplished in 8 weeks:                         1) Goal: Abolish LBP and radicular symptoms in order to return to normal function. Status at initial evaluation: LBP 10/10 at worst with intermittent bilateral feet numbness/tingling  Status at last progress note (11/09/17): max pain reduced to 7/10 with ambulation, sweeping, bending  Current status: max pain 7-8/10 with ADL, no improvement since last tested                            2) Goal: Pt will be able to demonstrate ability to self manage LBP to 2/10 at worst during all activities in order to return to normal function.   Status at initial evaluation: LBP 10/10 at worst  Status at last progress note (11/09/17): not reassessed  Current status: fair ability to self manage, pain up to 7-8/10 max, slow progress                          3) Updated goal as of 11/9/17: Patient using proper body mechanics with all material handling to minimize spinal loading and reduce pain. Status at last progress note (11/09/17): habitual poor body mechanics with floor to waist lifting and sweeping motion  Current status: needs occasional verbal cues with floor to waist lift, slow progress  Key Functional Changes: functional spinal mobility    ASSESSMENT/RECOMMENDATIONS:  [x]Continue therapy per initial plan/protocol at a frequency of  2 x per week for 8 weeks  []Continue therapy with the following recommended changes:_____________________      _____________________________________________________________________  []Discontinue therapy progressing towards or have reached established goals  []Discontinue therapy due to lack of appreciable progress towards goals  []Discontinue therapy due to lack of attendance or compliance  []Await Physician's recommendations/decisions regarding therapy  []Other:________________________________________________________________    Thank you for this referral.   Meliton Gonzalez, PT 12/19/2017 12:06 PM  NOTE TO PHYSICIAN:  PLEASE COMPLETE THE ORDERS BELOW AND   FAX TO [facility]: [phone]  If you are unable to process this request in 24 hours please contact our office:   [phone]  []  I have read the above report and request that my patient continue as recommended. []  I have read the above report and request that my patient continue therapy with the following changes/special instructions:________________________________________  []I have read the above report and request that my patient be discharged from therapy.     Physicians signature: ______________________________Date: ______Time:______

## 2017-12-19 NOTE — PROGRESS NOTES
PT DAILY TREATMENT NOTE     Patient Name: Marco Newman  Date:2017  : 1980  [x]  Patient  Verified  Payor: BLUE CROSS MEDICAID / Plan: [facility] [employer] [facility] [employer]ERS PLUS / Product Type: Managed Care Medicaid /    In time:945  Out time:1025  Total Treatment Time (min): 40  Visit #: 10 of 16    Treatment Area: Low back pain [M54.5]    SUBJECTIVE  Pain Level (0-10 scale): 5-6/10  Any medication changes, allergies to medications, adverse drug reactions, diagnosis change, or new procedure performed?: [x] No    [] Yes (see summary sheet for update)  Subjective functional status/changes:   [x] No changes reported  Unchanged pain mostly in right LB, occasional sharp pain in right thoracolumbar region  Has been sick recently  Discharged [employer]  Pain triggered by walking, standing, sweeping/mopping, prolonged sitting    OBJECTIVE    Modality rationale:    Min Type Additional Details    [] Estim:  []Unatt       []IFC  []Premod                        []Other:  []w/ice   []w/heat  Position:  Location:    [] Estim: []Att    []TENS instruct  []NMES                    []Other:  []w/US   []w/ice   []w/heat  Position:  Location:    []  Traction: [] Cervical       []Lumbar                       [] Prone          []Supine                       []Intermittent   []Continuous Lbs:  [] before manual  [] after manual    []  Ultrasound: []Continuous   [] Pulsed                           []1MHz   []3MHz W/cm2:  Location:    []  Iontophoresis with dexamethasone         Location: [] Take home patch   [] In clinic   Declined []  Ice     []  heat  []  Ice massage  []  Laser   []  Anodyne Position:  Location:    []  Laser with stim  []  Other:  Position:  Location:    []  Vasopneumatic Device Pressure:       [] lo [] med [] hi   Temperature: [] lo [] med [] hi   [] Skin assessment post-treatment:  []intact []redness- no adverse reaction    []redness  adverse reaction:      min []Eval                  []Re-Eval       15 min Therapeutic Exercise:  [x] See flow sheet :flexibility       10 min Therapeutic Activity:  [x]  See flow sheet :reevaluation        15 min Neuromuscular Re-education:  [x]  See flow sheet :   Rationale: increase strength, improve coordination and increase proprioception  to improve the patients ability to tolerate positions and ADLs. min Manual Therapy:       Rationale: decrease pain and correct joint alignment and joint mechanics to tolerate positions and ADLs. min Gait Training:  ___ feet with ___ device on level surfaces with ___ level of assist   Rationale: With   [] TE   [] TA   [] neuro   [] other: Patient Education: [x] Review HEP    [] Progressed/Changed HEP based on:   [] positioning   [] body mechanics   [] transfers   [] heat/ice application    [] other:      Other Objective/Functional Measures:     left LE lengthens in sitting- anteriorly rotated right innominate  Open disability application for mental reasons  Declined MH due to conflicting appointment    Pain Level (0-10 scale) post treatment: 6/10    ASSESSMENT/Changes in Function:    Pt's innominates required correction again, but innominates were in the same position as at initial evaluation. Innominates were able to be self corrected by pt using self METs. Pain decreased immediately after correcting innominate alignment.     Patient will continue to benefit from skilled PT services to modify and progress therapeutic interventions, address functional mobility deficits, address ROM deficits, address strength deficits, analyze and address soft tissue restrictions, analyze and cue movement patterns, analyze and modify body mechanics/ergonomics, assess and modify postural abnormalities and instruct in home and community integration to attain remaining goals.      [x]  See Plan of Care  [x]  See progress note/recertification  []  See Discharge Summary      Progress towards goals / Updated goals:Overall patient's progress has been slow and she has been unable to perform her HEP consistently due to a recent relocation. She currently reports fluctuating levels of pain in her LS and presents with residual deficits in postural alignment and core strength/LS stability. She has met 2 goals. I recommend continuation of current treatment. Short Term Goals: To be accomplished in 3 weeks:                         6) Goal: Pt's LBP will decrease to 5/10 at worst and remain at midline of spine in order to better tolerate all ADLs. Status at initial evaluation: pain 10/10 at worst  Status at last progress note (11/09/17): max pain 7/10, progressing  Current status: max pain 7-8/10, no improvement                            2) Goal: Innominate alignment will remain symmetric and stable in order to decrease pain and improve tolerance for ambulation and ADLs. Status at initial evaluation: anteriorly rotated right innominate (corrected on 10/11/17)  Status at last progress note (11/09/17): met (innominates aligned ) 10/26/17  Current status: good alignment, goal met                          3) Goal: Bilateral LEs radicular symptoms (feet numbness) will be centralized to the low back in order to demonstrate effectiveness of directional preference exercises and decrease risk of LE dysfunction. Status at initial evaluation: intermittent bilateral feet numbness/tingling  Status at last progress note (11/09/17): intermittent numbness in 2nd-5th toes, unsure if related to LS dysfunction,   Current status: still reporting  Numbness in digits 3/4 both feet intermittently, likely not related to LBP, negative SLR, no radicular symptoms, goal met                            0) Goal: Pt will be independent and compliant with HEP to achieve other goals.   Status at initial evaluation: pt is not independent with exercises  Status at last progress note (11/09/17): progressing (pt is 80% independent with current exercises) 10/26/17  Current status: patient has been sick lately and due to move has been unable to perform HEP on a regular basis, progressing      Long Term Goals: To be accomplished in 8 weeks:                         2) Goal: Abolish LBP and radicular symptoms in order to return to normal function. Status at initial evaluation: LBP 10/10 at worst with intermittent bilateral feet numbness/tingling  Status at last progress note (11/09/17): max pain reduced to 7/10 with ambulation, sweeping, bending  Current status: max pain 7-8/10 with ADL, no improvement since last tested                            2) Goal: Pt will be able to demonstrate ability to self manage LBP to 2/10 at worst during all activities in order to return to normal function. Status at initial evaluation: LBP 10/10 at worst  Status at last progress note (11/09/17): not reassessed  Current status: fair ability to self manage, pain up to 7-8/10 max, slow progress                          3) Updated goal as of 11/9/17: Patient using proper body mechanics with all material handling to minimize spinal loading and reduce pain.   Status at last progress note (11/09/17): habitual poor body mechanics with floor to waist lifting and sweeping motion  Current status: needs occasional verbal cues with floor to waist lift, slow progress    PLAN  []  Upgrade activities as tolerated     [x]  Continue plan of care  []  Update interventions per flow sheet       []  Discharge due to:_  []  Other:_      Lynnette Barbosa, PT 12/19/2017  2:32 PM    Future Appointments  Date Time Provider Rossy Mason   12/20/2017 1:00 PM [facility] [address] [facility]   12/21/2017 2:30 PM [facility]

## 2017-12-20 ENCOUNTER — APPOINTMENT (OUTPATIENT)
Dept: PHYSICAL THERAPY | Age: 37
End: 2017-12-20
Payer: MEDICAID

## 2017-12-26 ENCOUNTER — HOSPITAL ENCOUNTER (OUTPATIENT)
Dept: PHYSICAL THERAPY | Age: 37
Discharge: HOME | End: 2017-12-26
Payer: MEDICAID

## 2017-12-26 PROCEDURE — 97530 THERAPEUTIC ACTIVITIES: CPT | Performed by: PHYSICAL THERAPIST

## 2017-12-26 PROCEDURE — 97161 PT EVAL LOW COMPLEX 20 MIN: CPT | Performed by: PHYSICAL THERAPIST

## 2017-12-26 NOTE — PROGRESS NOTES
PT DAILY TREATMENT NOTE/HAND EVAL 3-    Patient Name: Darryle Hamlet  Date:2017  : 1980  [x]  Patient  Verified  Payor: BLUE CROSS MEDICAID / Plan: [facility] HEALTHKEEPERS PLUS / Product Type: Managed Care Medicaid /    In time:3:40  Out time:4:30  Total Treatment Time (min): 50  Total Timed Codes (min): 50  1:1 Treatment Time ( only): 50   Visit #: 1 of 12    Treatment Area: Bilateral wrist pain [M25.531, M25.532]    SUBJECTIVE  Pain Level (0-10 scale): 7  []constant []intermittent []improving []worsening []no change since onset    Any medication changes, allergies to medications, adverse drug reactions, diagnosis change, or new procedure performed?: [x] No    [] Yes (see summary sheet for update)  Subjective functional status/changes:     Patient has CC of B wrist pain for 5 yrs length of time. RITA: Angeline Sharpes 20ft from roof landed on hands and rear. Patient describes pain as aching, constant. Pain is less when sleeping. Reports numbness/tingling, reports numbness at night in both. Denies popping/clicking. Aggravating factors: witting, brushing hair, carry groceries. Alleviating factors: massage, ibuprofen, movement. Denies red flags: SOB, chest pain, dizziness/lightheadedness, blurred/double vision, HA, chills/fevers, night sweats, change in bowel/bladder control, abdominal pain, difficulty swallowing, slurred speech, unexplained weight gain/loss. PMHx: depression, OA, tobacco use 25yrs 1pk/day. Surgical Hx: L wrist 6 surgery, R 2x. Social Hx:  Home with family, work status: does not work. PLOF: unrestricted. CLOF:unable to work, cannot write/type.        OBJECTIVE/EXAMINATION    25 min [x]Eval                  []Re-Eval       25 min Therapeutic Activities:  [] See flow sheet : decrease pain   Rationale: increase ROM and decrease pain to improve the patients ability to complete ADLs        With   [] TE   [] TA   [] neuro   [] other: Patient Education: [x] Review HEP    [] Progressed/Changed HEP based on:   [] positioning   [] body mechanics   [] transfers   [] heat/ice application    [] other:      Other Objective/Functional Measures:   A/PROM:   Active     Norms Right Left   Shoulder Flex 0-180 WNL WNL    Ext 0-60 WNL WNL    abd 0-180 WNL WNL    IR 0-70 WNL WNL    ER 0-90 WNL WNL   Elbow Ext/flex 0-150 WNL WNL   Forearm Supination 0-80 0-60 WNL    Pronation 0-80 WNL WNL   Wrist Flex 0-80 0-45 0-45    Ext 0-70 0-50 0-50    Ulnar Dev 0-30 0-30 0-30    Radial Dev 0-20 0-10 0-10     Strength: MMT  Right Left   Shoulder Flex /5 /5    Ext /5 /5    abd 4+/5 4+/5    Horizontal add /5 /5    IR /5 /5    ER /5 /5   Elbow Ext/flex 5/5 5/5   Forearm Supination 4/5 4/5    Pronation 4/5 4/5   Wrist Flex 5/5 5/5    Ext 5/5 5/5    Ulnar Dev 4+/5 4+/5    Radial Dev 4/5 4/5     Hand AROM: WNL,     Hand Strength: Gross Grasp   Right  61.6lbs   Left 59.6lbs        Pain Level (0-10 scale) post treatment: 7    ASSESSMENT/Changes in Function:  Patient presents with signs/symptoms of non-specific, persistent wrist pain. Patient has decreased ROM, decreased strength, pain affecting function. Patient will continue to benefit from skilled PT services to modify and progress therapeutic interventions, address functional mobility deficits, address ROM deficits, address strength deficits, analyze and address soft tissue restrictions, analyze and cue movement patterns, analyze and modify body mechanics/ergonomics and assess and modify postural abnormalities to attain remaining goals.      [x]  See Plan of Care  []  See progress note/recertification  []  See Discharge Summary         Progress towards goals / Updated goals:  See POC    PLAN  []  Upgrade activities as tolerated     [x]  Continue plan of care  []  Update interventions per flow sheet       []  Discharge due to:_  []  Other:_      Say Malloy, PT, DPT 12/26/2017  1:22 PM

## 2017-12-26 NOTE — PROGRESS NOTES
[facility] [address]  Phone: [phone] Fax: [phone] of Care/ Statement of Necessity for Physical Therapy Services    Patient name: Vanesa Lal Start of Care: 2017   Referral source: Jasmyn Finley MD : 1980    Medical Diagnosis: Bilateral wrist pain [M25.531, M25.532]   Onset Date:5 years    Treatment Diagnosis: B wrist pain, persistent   Prior Hospitalization: see medical history Provider#: [id number]   Medications: Verified on Patient summary List    Comorbidities: depression, OA, tobacco use   Prior Level of Function: unrestricted      The Plan of Care and following information is based on the information from the initial evaluation. Assessment/ key information: Patient presents with signs/symptoms of non-specific, persistent wrist pain. Patient has decreased ROM, decreased strength, pain affecting function. Patient will continue to benefit from skilled PT services to modify and progress therapeutic interventions, address functional mobility deficits, address ROM deficits, address strength deficits, analyze and address soft tissue restrictions, analyze and cue movement patterns, analyze and modify body mechanics/ergonomics and assess and modify postural abnormalities to attain remaining goals.   Evaluation Complexity History HIGH Complexity :3+ comorbidities / personal factors will impact the outcome/ POC ; Examination MEDIUM Complexity : 3 Standardized tests and measures addressing body structure, function, activity limitation and / or participation in recreation  ;Presentation LOW Complexity : Stable, uncomplicated  ;Clinical Decision Making MEDIUM Complexity : FOTO score of 26-74  Overall Complexity Rating: LOW   Problem List: pain affecting function, decrease ROM, decrease strength, edema affecting function, decrease ADL/ functional abilitiies and decrease activity tolerance   Treatment Plan may include any combination of the following: Therapeutic exercise, Therapeutic activities, Neuromuscular re-education, Physical agent/modality, Manual therapy, Patient education and Self Care training  Patient / Family readiness to learn indicated by: asking questions, trying to perform skills and interest  Persons(s) to be included in education: patient (P)  Barriers to Learning/Limitations: None  Patient Goal (s): decrease pain  Patient Self Reported Health Status: good  Rehabilitation Potential: fair    Short Term Goals: To be accomplished in 2 weeks:   Patient will report compliance with HEP at least 1x/day to aid in rehabilitation program.   Status at IE: NA   Current:     Patient will display pain free AROM into 0-55 degrees wrist flexion to aid in completion of ADLs   Status at IE: 0-45 B   Current:      Long Term Goals: To be accomplished in 4- 6 weeks:   Patient will report compliance with HEP a least 3-4x/week to aid in rehabilitation/strengthening program.   Status at IE: NA   Current:   Patient will increase B  strength to 70lbs to aid in completion of ADLs. Status at IE: 59.6 L, and 61.6 Rlbs   Current:     Patient will increase B UE strength to 5/5 throughout all planes to aid in completion of ADLs. Status at IE: 4/5 in forearm sup/pronation and ulnar/radial deviation   Current:     Patient will increase FOTO score to 51 points overall to demonstrate improvement in functional status      Frequency / Duration: Patient to be seen 2 times per week for 6 weeks. Patient/ Caregiver education and instruction: Diagnosis, prognosis, self care, activity modification and exercises   [x]  Plan of care has been reviewed with STEFANI North PT, DPT 12/26/2017 4:43 PM    ________________________________________________________________________    I certify that the above Therapy Services are being furnished while the patient is under my care.  I agree with the treatment plan and certify that this therapy is necessary. [de-identified] Signature:____________________  Date:____________Time: _________    Please sign and return to [facility] at [facility].  Nuzhat Maza [address]  Phone: [phone] Fax: [phone]

## 2017-12-28 ENCOUNTER — APPOINTMENT (OUTPATIENT)
Dept: PHYSICAL THERAPY | Age: 37
End: 2017-12-28
Payer: MEDICAID

## 2018-01-04 ENCOUNTER — APPOINTMENT (OUTPATIENT)
Dept: PHYSICAL THERAPY | Age: 38
End: 2018-01-04
Payer: MEDICAID

## 2018-01-05 ENCOUNTER — APPOINTMENT (OUTPATIENT)
Dept: PHYSICAL THERAPY | Age: 38
End: 2018-01-05
Payer: MEDICAID

## 2018-01-08 ENCOUNTER — APPOINTMENT (OUTPATIENT)
Dept: PHYSICAL THERAPY | Age: 38
End: 2018-01-08
Payer: MEDICAID

## 2018-01-18 ENCOUNTER — APPOINTMENT (OUTPATIENT)
Dept: PHYSICAL THERAPY | Age: 38
End: 2018-01-18
Payer: MEDICAID

## 2018-01-22 ENCOUNTER — HOSPITAL ENCOUNTER (OUTPATIENT)
Dept: PHYSICAL THERAPY | Age: 38
Discharge: HOME | End: 2018-01-22
Payer: MEDICAID

## 2018-01-22 PROCEDURE — 97110 THERAPEUTIC EXERCISES: CPT

## 2018-01-22 PROCEDURE — 97530 THERAPEUTIC ACTIVITIES: CPT

## 2018-01-22 NOTE — PROGRESS NOTES
PT DAILY TREATMENT NOTE     Patient Name: Jess Last  Date:2018  : 1980  [x]  Patient  Verified  Payor: BLUE CROSS MEDICAID / Plan: [id number] [place] [address] / Product Type: Managed Care Medicaid /    In time:955  Out time:1025  Total Treatment Time (min): 40  Visit #: 11 of 16    Treatment Area: Bilateral wrist pain [M25.531, M25.532]    SUBJECTIVE  Pain Level (0-10 scale): 7/10  Any medication changes, allergies to medications, adverse drug reactions, diagnosis change, or new procedure performed?: [x] No    [] Yes (see summary sheet for update)  Subjective functional status/changes:   [x] No changes reported  Unchanged pain mostly in right LB,   Unchanged pain at night  Not working due to back injury, was working as  prior to fall in 2013, no work since then  Plans to return to work as physically able. Pain triggered by walking, standing, sweeping/mopping, prolonged sitting    Reports no pain in LB condition. Continues with pain. Onset of increased pain when crossing right leg over in seated position. Pain ranging from 5-7/10.   Open disability claim for mental reasons    Appointment at [address] [place] [address] with NP    OBJECTIVE    Modality rationale:    Min Type Additional Details    [] Estim:  []Unatt       []IFC  []Premod                        []Other:  []w/ice   []w/heat  Position:  Location:    [] Estim: []Att    []TENS instruct  []NMES                    []Other:  []w/US   []w/ice   []w/heat  Position:  Location:    []  Traction: [] Cervical       []Lumbar                       [] Prone          []Supine                       []Intermittent   []Continuous Lbs:  [] before manual  [] after manual    []  Ultrasound: []Continuous   [] Pulsed                           []1MHz   []3MHz W/cm2:  Location:    []  Iontophoresis with dexamethasone         Location: [] Take home patch   [] In clinic   Declined []  Ice     []  heat  []  Ice massage  []  Laser   []  Anodyne Position:  Location:    []  Laser with stim  []  Other:  Position:  Location:    []  Vasopneumatic Device Pressure:       [] lo [] med [] hi   Temperature: [] lo [] med [] hi   [] Skin assessment post-treatment:  []intact []redness- no adverse reaction    []redness  adverse reaction:      min []Eval                  []Re-Eval       15 min Therapeutic Exercise:  [x] See flow sheet :flexibility       10 min Therapeutic Activity:  [x]  See flow sheet :reevaluation        15 min Neuromuscular Re-education:  [x]  See flow sheet :   Rationale: increase strength, improve coordination and increase proprioception  to improve the patients ability to tolerate positions and ADLs. min Manual Therapy:       Rationale: decrease pain and correct joint alignment and joint mechanics to tolerate positions and ADLs. min Gait Training:  ___ feet with ___ device on level surfaces with ___ level of assist   Rationale: With   [] TE   [] TA   [] neuro   [] other: Patient Education: [x] Review HEP    [] Progressed/Changed HEP based on:   [] positioning   [] body mechanics   [] transfers   [] heat/ice application    [] other:      Other Objective/Functional Measures:     left LE lengthens in sitting- anteriorly rotated right innominate  Open disability application for mental reasons  Declined MH due to conflicting appointment    Pain Level (0-10 scale) post treatment: 6/10    ASSESSMENT/Changes in Function:    Pt's innominates required correction again, but innominates were in the same position as at initial evaluation. Innominates were able to be self corrected by pt using self METs. Pain decreased immediately after correcting innominate alignment.     Patient will continue to benefit from skilled PT services to modify and progress therapeutic interventions, address functional mobility deficits, address ROM deficits, address strength deficits, analyze and address soft tissue restrictions, analyze and cue movement patterns, analyze and modify body mechanics/ergonomics, assess and modify postural abnormalities and instruct in home and community integration to attain remaining goals.      [x]  See Plan of Care  [x]  See progress note/recertification  []  See Discharge Summary      Progress towards goals / Updated goals:Overall patient's progress has been slow and she has been unable to perform her HEP consistently . She has not attended therapy for her LB since 12/19/17 . She was evaluated for her wrist pain on 12/26/17 but also has not attended any of the scheduled visits. I have discussed the need to attend therapy on a regular basis in order to make progress. She currently reports unchanged levels of pain in her LS ranging from 5-8/10 with most pain during sweeping/mopping, household activities and with prolonged walking/standing/sitting. She presents with residual deficits in postural alignment and core strength/LS stability. During today's visit she presented with functional spinal mobility and reports of right QL pain of 5/10. During testing no antalgic movement patterns were observed. She has met 2 goals. I recommend continuation of current treatment with focus on material handling, functional strengthening and flexibility pending regular attendance of therapy. Please feel free to contact me in case of any further questions. Short Term Goals: To be accomplished in 3 weeks:                         4) Goal: Pt's LBP will decrease to 5/10 at worst and remain at midline of spine in order to better tolerate all ADLs. Status at initial evaluation: pain 10/10 at worst  Status at last progress note (11/09/17): max pain 7/10, progressing  Current status: max pain 7-8/10,overall pain ranging from 5-7/10, no improvement                            2) Goal: Innominate alignment will remain symmetric and stable in order to decrease pain and improve tolerance for ambulation and ADLs.   Status at initial evaluation: anteriorly rotated right innominate (corrected on 10/11/17)  Status at last progress note (11/09/17): met (innominates aligned ) 10/26/17  Current status: good alignment, goal met                          3) Goal: Bilateral LEs radicular symptoms (feet numbness) will be centralized to the low back in order to demonstrate effectiveness of directional preference exercises and decrease risk of LE dysfunction. Status at initial evaluation: intermittent bilateral feet numbness/tingling  Status at last progress note (11/09/17): intermittent numbness in 2nd-5th toes, unsure if related to LS dysfunction,   Current status: still reporting  Numbness in digits 3/4 both feet intermittently, likely not related to LBP, negative SLR, no radicular symptoms, goal met                            8) Goal: Pt will be independent and compliant with HEP to achieve other goals. Status at initial evaluation: pt is not independent with exercises  Status at last progress note (11/09/17): progressing (pt is 80% independent with current exercises) 10/26/17  Current status: patient has been sick lately and due to move has been unable to perform HEP on a regular basis, progressing  [address] be accomplished in 8 weeks:                         1) Goal: Abolish LBP and radicular symptoms in order to return to normal function. Status at initial evaluation: LBP 10/10 at worst with intermittent bilateral feet numbness/tingling  Status at last progress note (11/09/17): max pain reduced to 7/10 with ambulation, sweeping, bending  Current status: max pain 7-8/10 with ADL, no improvement since last tested                            2) Goal: Pt will be able to demonstrate ability to self manage LBP to 2/10 at worst during all activities in order to return to normal function.   Status at initial evaluation: LBP 10/10 at worst  Status at last progress note (11/09/17): not reassessed  Current status: fair ability to self manage, pain up to 7-8/10 max, slow progress                          8) Updated goal as of 11/9/17: Patient using proper body mechanics with all material handling to minimize spinal loading and reduce pain.   Status at last progress note (11/09/17): habitual poor body mechanics with floor to waist lifting and sweeping motion  Current status: needs occasional verbal cues with floor to waist lift, slow progress    PLAN  []  Upgrade activities as tolerated     [x]  Continue plan of care  []  Update interventions per flow sheet       []  Discharge due to:_  []  Other:_      Connie Rausch, PT 1/22/2018  2:32 PM    Future Appointments  Date Time Provider Rossy Mason   1/24/2018 9:30 AM Connie Rausch, PT [facility]

## 2018-01-22 NOTE — PROGRESS NOTES
[facility] in [address] [place], [facility] [address]  Phone: [phone]      Fax:  [phone]    Progress Note  Patient name: Lois Mcdaniel Start of Care: 10/11/2017   Referral source: Deepak Malik MD : 1980                         Medical Diagnosis: Low back pain [M54.5] Onset Date: 13                         Treatment Diagnosis: decreased positional and functional tolerance, pelvis obliquity/instability, LE radiculopathy, decreased core/back strength/stability   Prior Hospitalization: see medical history Provider#: [id number]   Medications: Verified on Patient summary List    Comorbidities: depression, T-spine compression fracture with Schmorl's nodes, OA, tobacco use   Prior Level of Function: no deficits, independent with ADLs      Visits from Start of Care: 11    Missed Visits: 4        Progress towards goals / Updated goals:Overall patient's progress has been slow and she has been unable to perform her HEP consistently . She has not attended therapy for her LB since 17 . She was evaluated for her wrist pain on 17 but also has not attended any of the scheduled visits. I have discussed the need to attend therapy on a regular basis in order to make progress. She currently reports unchanged levels of pain in her LS ranging from 5-8/10 with most pain during sweeping/mopping, household activities and with prolonged walking/standing/sitting. She presents with residual deficits in postural alignment and core strength/LS stability. During today's visit she presented with functional spinal mobility and reports of right QL pain of 5/10. During testing no antalgic movement patterns were observed. She has met 2 goals. I recommend continuation of current treatment with focus on material handling, functional strengthening and flexibility pending regular attendance of therapy. Please feel free to contact me in case of any further questions.   Short Term Goals: To be accomplished in 3 weeks:                         8) Goal: Pt's LBP will decrease to 5/10 at worst and remain at midline of spine in order to better tolerate all ADLs. Status at initial evaluation: pain 10/10 at worst  Status at last progress note (11/09/17): max pain 7/10, progressing  Current status: max pain 7-8/10,overall pain ranging from 5-7/10, no improvement                            2) Goal: Innominate alignment will remain symmetric and stable in order to decrease pain and improve tolerance for ambulation and ADLs. Status at initial evaluation: anteriorly rotated right innominate (corrected on 10/11/17)  Status at last progress note (11/09/17): met (innominates aligned ) 10/26/17  Current status: good alignment, goal met                          3) Goal: Bilateral LEs radicular symptoms (feet numbness) will be centralized to the low back in order to demonstrate effectiveness of directional preference exercises and decrease risk of LE dysfunction. Status at initial evaluation: intermittent bilateral feet numbness/tingling  Status at last progress note (11/09/17): intermittent numbness in 2nd-5th toes, unsure if related to LS dysfunction,   Current status: still reporting  Numbness in digits 3/4 both feet intermittently, likely not related to LBP, negative SLR, no radicular symptoms, goal met                            4) Goal: Pt will be independent and compliant with HEP to achieve other goals. Status at initial evaluation: pt is not independent with exercises  Status at last progress note (11/09/17): progressing (pt is 80% independent with current exercises) 10/26/17  Current status: patient has been sick lately and due to move has been unable to perform HEP on a regular basis, progressing  [address] be accomplished in 8 weeks:                         1) Goal: Abolish LBP and radicular symptoms in order to return to normal function.   Status at initial evaluation: LBP 10/10 at worst with intermittent bilateral feet numbness/tingling  Status at last progress note (11/09/17): max pain reduced to 7/10 with ambulation, sweeping, bending  Current status: max pain 7-8/10 with ADL, no improvement since last tested                            7) Goal: Pt will be able to demonstrate ability to self manage LBP to 2/10 at worst during all activities in order to return to normal function. Status at initial evaluation: LBP 10/10 at worst  Status at last progress note (11/09/17): not reassessed  Current status: fair ability to self manage, pain up to 7-8/10 max, slow progress                          3) Updated goal as of 11/9/17: Patient using proper body mechanics with all material handling to minimize spinal loading and reduce pain.   Status at last progress note (11/09/17): habitual poor body mechanics with floor to waist lifting and sweeping motion  Current status: needs occasional verbal cues with floor to waist lift, slow progress      Key Functional Changes: ability to perform basic housekeeping with assist of her 15 YO daughter, functional spinal mobility    ASSESSMENT/RECOMMENDATIONS:  [x]Continue therapy per initial plan/protocol at a frequency of  2 x per week for 8 weeks (5 visits remaining)  []Continue therapy with the following recommended changes:_____________________      _____________________________________________________________________  []Discontinue therapy progressing towards or have reached established goals  []Discontinue therapy due to lack of appreciable progress towards goals  []Discontinue therapy due to lack of attendance or compliance  []Await Physician's recommendations/decisions regarding therapy  []Other:________________________________________________________________    Thank you for this referral.   Eligio Duffy, PT 1/22/2018 12:53 PM  NOTE TO PHYSICIAN:  PLEASE COMPLETE THE ORDERS BELOW AND   FAX TO [facility]: [phone]  If you are unable to process this request in 24 hours please contact our office:   [phone]  []  I have read the above report and request that my patient continue as recommended. []  I have read the above report and request that my patient continue therapy with the following changes/special instructions:________________________________________  []I have read the above report and request that my patient be discharged from therapy.     Physicians signature: ______________________________Date: ______Time:______

## 2018-01-24 ENCOUNTER — HOSPITAL ENCOUNTER (OUTPATIENT)
Dept: PHYSICAL THERAPY | Age: 38
Discharge: HOME | End: 2018-01-24
Payer: MEDICAID

## 2018-01-24 PROCEDURE — 97110 THERAPEUTIC EXERCISES: CPT

## 2018-01-24 PROCEDURE — 97140 MANUAL THERAPY 1/> REGIONS: CPT

## 2018-01-24 PROCEDURE — 97530 THERAPEUTIC ACTIVITIES: CPT

## 2018-01-24 PROCEDURE — 97112 NEUROMUSCULAR REEDUCATION: CPT

## 2018-01-24 NOTE — PROGRESS NOTES
PT DAILY TREATMENT NOTE     Patient Name: Freddie Trujillo  Date:2018  : 1980  [x]  Patient  Verified  Payor: BLUE CROSS MEDICAID / Plan: [facility] HEALTHKEEPERS PLUS / Product Type: Managed Care Medicaid /    In time:1015  Out time:1055  Total Treatment Time (min): 40  Visit #: 12 of 16    Treatment Area: Low back pain [M54.5]    SUBJECTIVE  Pain Level (0-10 scale): 7/10  Any medication changes, allergies to medications, adverse drug reactions, diagnosis change, or new procedure performed?: [x] No    [] Yes (see summary sheet for update)  Subjective functional status/changes:   [x] No changes reported  Mid mid back on the right is hurting    OBJECTIVE    Modality rationale:    Min Type Additional Details    [] Estim:  []Unatt       []IFC  []Premod                        []Other:  []w/ice   []w/heat  Position:  Location:    [] Estim: []Att    []TENS instruct  []NMES                    []Other:  []w/US   []w/ice   []w/heat  Position:  Location:    []  Traction: [] Cervical       []Lumbar                       [] Prone          []Supine                       []Intermittent   []Continuous Lbs:  [] before manual  [] after manual    []  Ultrasound: []Continuous   [] Pulsed                           []1MHz   []3MHz W/cm2:  Location:    []  Iontophoresis with dexamethasone         Location: [] Take home patch   [] In clinic   10 []  Ice     [x]  heat  []  Ice massage  []  Laser   []  Anodyne Position:90/90  Location:back    []  Laser with stim  []  Other:  Position:  Location:    []  Vasopneumatic Device Pressure:       [] lo [] med [] hi   Temperature: [] lo [] med [] hi   [] Skin assessment post-treatment:  []intact []redness- no adverse reaction    []redness  adverse reaction:      min []Eval                  []Re-Eval       10 min Therapeutic Exercise:  [x] See flow sheet :flexibility        min Therapeutic Activity:  [x]  See flow sheet :        10 min Neuromuscular Re-education:  [x]  See flow sheet :foam roller, core activities, body mechanics   Rationale: increase strength, improve coordination and increase proprioception  to improve the patients ability to tolerate positions and ADLs. 15 min Manual Therapy:functional massage right ES mm in prone, use of foam roller in supine for STM and mid TS mobs into Ext       Rationale: decrease pain and correct joint alignment and joint mechanics to tolerate positions and ADLs. min Gait Training:  ___ feet with ___ device on level surfaces with ___ level of assist   Rationale: With   [] TE   [] TA   [] neuro   [] other: Patient Education: [x] Review HEP    [] Progressed/Changed HEP based on:   [] positioning   [] body mechanics   [] transfers   [] heat/ice application    [] other:      Other Objective/Functional Measures:     TTP ES thoracolumbar region right  Reduction in pain after mobs and IL activities  Challenged with plank activities  No pain in QP during spinal Flex    Pain Level (0-10 scale) post treatment: 4/10    ASSESSMENT/Changes in Function:    Good tolerance to spinal mobs and STM,     Patient will continue to benefit from skilled PT services to modify and progress therapeutic interventions, address functional mobility deficits, address ROM deficits, address strength deficits, analyze and address soft tissue restrictions, analyze and cue movement patterns, analyze and modify body mechanics/ergonomics, assess and modify postural abnormalities and instruct in home and community integration to attain remaining goals.      [x]  See Plan of Care  [x]  See progress note/recertification  []  See Discharge Summary      Progress towards goals / Updated goals:Overall patient's progress has been slow and she has been unable to perform her HEP consistently . She has not attended therapy for her LB since 12/19/17 . She was evaluated for her wrist pain on 12/26/17 but also has not attended any of the scheduled visits.  I have discussed the need to attend therapy on a regular basis in order to make progress. She currently reports unchanged levels of pain in her LS ranging from 5-8/10 with most pain during sweeping/mopping, household activities and with prolonged walking/standing/sitting. She presents with residual deficits in postural alignment and core strength/LS stability. During today's visit she presented with functional spinal mobility and reports of right QL pain of 5/10. During testing no antalgic movement patterns were observed. She has met 2 goals. I recommend continuation of current treatment with focus on material handling, functional strengthening and flexibility pending regular attendance of therapy. Please feel free to contact me in case of any further questions. Short Term Goals: To be accomplished in 3 weeks:                         3) Goal: Pt's LBP will decrease to 5/10 at worst and remain at midline of spine in order to better tolerate all ADLs. Status at initial evaluation: pain 10/10 at worst  Status at last progress note (11/09/17): max pain 7/10, progressing  Current status: max pain 7-8/10,overall pain ranging from 5-7/10, no improvement                            2) Goal: Innominate alignment will remain symmetric and stable in order to decrease pain and improve tolerance for ambulation and ADLs. Status at initial evaluation: anteriorly rotated right innominate (corrected on 10/11/17)  Status at last progress note (11/09/17): met (innominates aligned ) 10/26/17  Current status: good alignment, goal met                          3) Goal: Bilateral LEs radicular symptoms (feet numbness) will be centralized to the low back in order to demonstrate effectiveness of directional preference exercises and decrease risk of LE dysfunction.   Status at initial evaluation: intermittent bilateral feet numbness/tingling  Status at last progress note (11/09/17): intermittent numbness in 2nd-5th toes, unsure if related to LS dysfunction,   Current status: still reporting  Numbness in digits 3/4 both feet intermittently, likely not related to LBP, negative SLR, no radicular symptoms, goal met                            4) Goal: Pt will be independent and compliant with HEP to achieve other goals. Status at initial evaluation: pt is not independent with exercises  Status at last progress note (11/09/17): progressing (pt is 80% independent with current exercises) 10/26/17  Current status: patient has been sick lately and due to move has been unable to perform HEP on a regular basis, progressing  [address] be accomplished in 8 weeks:                         1) Goal: Abolish LBP and radicular symptoms in order to return to normal function. Status at initial evaluation: LBP 10/10 at worst with intermittent bilateral feet numbness/tingling  Status at last progress note (11/09/17): max pain reduced to 7/10 with ambulation, sweeping, bending  Current status: max pain 7-8/10 with ADL, no improvement since last tested                            2) Goal: Pt will be able to demonstrate ability to self manage LBP to 2/10 at worst during all activities in order to return to normal function. Status at initial evaluation: LBP 10/10 at worst  Status at last progress note (11/09/17): not reassessed  Current status: fair ability to self manage, pain up to 7-8/10 max, slow progress                          3) Updated goal as of 11/9/17: Patient using proper body mechanics with all material handling to minimize spinal loading and reduce pain.   Status at last progress note (11/09/17): habitual poor body mechanics with floor to waist lifting and sweeping motion  Current status: needs occasional verbal cues with floor to waist lift, slow progress    PLAN  []  Upgrade activities as tolerated     [x]  Continue plan of care  []  Update interventions per flow sheet       []  Discharge due to:_  []  Other:_      Laura Gurrola, PT 1/24/2018  2:32 PM    Future Appointments  Date Time Provider Rossy Isabella   1/30/2018 2:00 PM Ottis Sands [facility]   1/30/2018 2:30 PM Ottis Sands [facility]   2/2/2018 9:30 AM Ottis Sands [facility]   2/2/2018 10:00 AM Ottis Sands [facility]   2/5/2018 9:30 AM Ottis Sands [facility]   2/5/2018 10:00 AM Abelardo Valentine Remesic [facility]   2/7/2018 10:00 AM Delilah Everett, PT [facility]   2/7/2018 10:30 AM Delilah Everett, PT [facility]   2/12/2018 9:30 AM Delilah Everett, PT [facility]   2/12/2018 10:00 AM Delilah Everett, PT [facility]   2/14/2018 9:30 AM Delilah Everett, PT [facility]   2/14/2018 10:00 AM Genie Lopez, PT [facility]   2/19/2018 9:30 AM Latisha Lever A Remesic [facility]   2/19/2018 10:00 AM Latisha Lever A Remesic [facility]   2/21/2018 9:30 AM Delilah Everett, PT [facility]   2/21/2018 10:00 AM Genie Lopez, PT [facility]

## 2018-01-24 NOTE — PROGRESS NOTES
PT DAILY TREATMENT NOTE     Patient Name: Radha Damico  Date:2018  : 1980  [x]  Patient  Verified  Payor: BLUE CROSS MEDICAID / Plan: [facility] HEALTHKEEPERS PLUS / Product Type: Managed Care Medicaid /    In time:940  Out time:1015  Total Treatment Time (min): 35  Visit #: 2 of 12    Treatment Area: Bilateral wrist pain [M25.531, M25.532]    SUBJECTIVE  Pain Level (0-10 scale): 4 left wrist  Any medication changes, allergies to medications, adverse drug reactions, diagnosis change, or new procedure performed?: [x] No    [] Yes (see summary sheet for update)  Subjective functional status/changes:   [] No changes reported  MD visit yesterday. Will have chronic wrist pain due to multiple fractures and ORIF/multiple surgeries in  [facility]). Reports still having wrist pain with writing, housekeeping, grasping. Occasional swelling duarte at night. Ain   Was given a new cream to apply to wrist 4x daily. Will bring exact name next week. Pain ranging from 0 in AM to 8/10 by the end of the day. Using wrist guards sometime.      OBJECTIVE    Modality rationale: Pain control as needed   Min Type Additional Details    [] Estim:  []Unatt       []IFC  []Premod                        []Other:  []w/ice   []w/heat  Position:  Location:    [] Estim: []Att    []TENS instruct  []NMES                    []Other:  []w/US   []w/ice   []w/heat  Position:  Location:    []  Traction: [] Cervical       []Lumbar                       [] Prone          []Supine                       []Intermittent   []Continuous Lbs:  [] before manual  [] after manual    []  Ultrasound: []Continuous   [] Pulsed                           []1MHz   []3MHz W/cm2:  Location:    []  Iontophoresis with dexamethasone         Location: [] Take home patch   [] In clinic    []  Ice     []  heat  []  Ice massage  []  Laser   []  Anodyne Position:  Location:    []  Laser with stim  []  Other:  Position:  Location:    []  Vasopneumatic Device Pressure:       [] lo [] med [] hi   Temperature: [] lo [] med [] hi   [] Skin assessment post-treatment:  []intact []redness- no adverse reaction    []redness  adverse reaction:       25 min Therapeutic Exercise:  [x] See flow sheet :ROM, functional strength, material handling   Rationale: increase ROM and increase strength to improve the patients ability to return to PLOF    10 min Therapeutic Activity:  [x]  See flow sheet :reevaluation of status, instruction in HEP including grasping, ROM in all planes   Rationale: increase ROM and increase strength  to improve the patients ability to return to all ADL          min Manual Therapy:                   With   [] TE   [] TA   [] neuro   [] other: Patient Education: [x] Review HEP    [] Progressed/Changed HEP based on:   [] positioning   [] body mechanics   [] transfers   [] heat/ice application    [] other:      Other Objective/Functional Measures: mild pain with TE in left >right wrist, pain up to 5 left, 2 right     Pain Level (0-10 scale) post treatment: left 3, right 2    ASSESSMENT/Changes in Function: good understanding of HEP    Patient will continue to benefit from skilled PT services to address ROM deficits and address strength deficits to attain remaining goals. [x]  See Plan of Care  []  See progress note/recertification  []  See Discharge Summary          Short Term Goals: To be accomplished in 2 weeks: 1.Patient will report compliance with HEP at least 1x/day to aid in rehabilitation program.                        Status at IE: initial instruction                        Current:NT                           2.Patient will display pain free AROM into 0-55 degrees wrist flexion to aid in completion of ADLs                        Status at IE: 0-45 B                        Current:NT        Long Term Goals: To be accomplished in 4- 6 weeks:                         1.Patient will report compliance with HEP a least 3-4x/week to aid in rehabilitation/strengthening program.                        Status at IE: NA                        Current:NT                          2.Patient will increase B  strength to 70lbs to aid in completion of ADLs. Status at IE: 59.6 L, and 61.6 Rlbs                        Current:Left 50, right 55#                           3.Patient will increase B UE strength to 5/5 throughout all planes to aid in completion of ADLs.                         Status at IE: 4/5 in forearm sup/pronation and ulnar/radial deviation                        Current:NT                           4.Patient will increase FOTO score to 51 points overall to demonstrate improvement in functional status                         Status at Eval: 41/100                          5. Ability to perform ADL with pain <or= 3/10 max for increased function and tolerances                        Current status: pain ranging from 0-8/10 with ADL duarte sweeping, mopping      PLAN  []  Upgrade activities as tolerated     [x]  Continue plan of care  []  Update interventions per flow sheet       []  Discharge due to:_  []  Other:_      Lona Larios PT 1/24/2018  9:45 AM    Future Appointments  Date Time Provider Rossy Mason   1/24/2018 10:00 AM Lona Larios PT [facility]

## 2018-01-24 NOTE — PROGRESS NOTES
[facility] in [address] G[place], [facility] [address]  Phone: [phone]      Fax:  [phone]    Progress Note  Patient name: Carlos Carroll Start of Care: 2017   Referral source: Manoj Rodriges MD : 1980                         Medical Diagnosis: Bilateral wrist pain [M25.531, M25.532] Onset Date:5 years                         Treatment Diagnosis: B wrist pain, persistent   Prior Hospitalization: see medical history Provider#: [id number]   Medications: Verified on Patient summary List    Comorbidities: depression, OA, tobacco use   Prior Level of Function: unrestricted      Visits from Start of Care: 2    Missed Visits: 4       Short Term Goals: To be accomplished in 2 weeks: 1.Patient will report compliance with HEP at least 1x/day to aid in rehabilitation program.                        Status at IE: initial instruction                        Current:NT                           2.Patient will display pain free AROM into 0-55 degrees wrist flexion to aid in completion of ADLs                        Status at IE: 0-45 B                        Current:NT        Long Term Goals: To be accomplished in 4- 6 weeks: 1.Patient will report compliance with HEP a least 3-4x/week to aid in rehabilitation/strengthening program.                        Status at IE: NA                        Current:NT                          2.Patient will increase B  strength to 70lbs to aid in completion of ADLs. Status at IE: 59.6 L, and 61.6 Rlbs                        Current:Left 50, right 55#                           3.Patient will increase B UE strength to 5/5 throughout all planes to aid in completion of ADLs.                         Status at IE: 4/5 in forearm sup/pronation and ulnar/radial deviation                        Current:NT                           4.Patient will increase FOTO score to 51 points overall to demonstrate improvement in functional status                         Status at Eval: 41/100                          5. Ability to perform ADL with pain <or= 3/10 max for increased function and tolerances                        Current status: pain ranging from 0-8/10 with ADL duarte sweeping, mopping    Key Functional Changes: functional wrist mobility    ASSESSMENT/RECOMMENDATIONS:Mrs. Gi Briceno has attended only 1 visit since initial evaluation on 12/26/17 . She still presents with reports of pain ranging from 0-8/10 with ADL and is motivated to attend therapy on a regular basis now. [x]Continue therapy per initial plan/protocol at a frequency of  2 x per week for 6 weeks  []Continue therapy with the following recommended changes:_____________________      _____________________________________________________________________  []Discontinue therapy progressing towards or have reached established goals  []Discontinue therapy due to lack of appreciable progress towards goals  []Discontinue therapy due to lack of attendance or compliance  []Await Physician's recommendations/decisions regarding therapy  []Other:________________________________________________________________    Thank you for this referral.   Terra Richardson, PT 1/24/2018 10:57 AM  NOTE TO PHYSICIAN:  PLEASE COMPLETE THE ORDERS BELOW AND   FAX TO [facility]: [phone]  If you are unable to process this request in 24 hours please contact our office:   [phone]  []  I have read the above report and request that my patient continue as recommended. []  I have read the above report and request that my patient continue therapy with the following changes/special instructions:________________________________________  []I have read the above report and request that my patient be discharged from therapy.     Physicians signature: ______________________________Date: ______Time:______

## 2018-01-30 ENCOUNTER — HOSPITAL ENCOUNTER (OUTPATIENT)
Dept: PHYSICAL THERAPY | Age: 38
Discharge: HOME | End: 2018-01-30
Payer: MEDICAID

## 2018-01-30 PROCEDURE — 97140 MANUAL THERAPY 1/> REGIONS: CPT

## 2018-01-30 PROCEDURE — 97112 NEUROMUSCULAR REEDUCATION: CPT

## 2018-01-30 PROCEDURE — 97110 THERAPEUTIC EXERCISES: CPT

## 2018-01-30 NOTE — PROGRESS NOTES
PT DAILY TREATMENT NOTE     Patient Name: Greg Ness  OCCL:  : 1980  [x]  Patient  Verified  Payor: BLUE CROSS MEDICAID / [facility] / Product Type: Managed Care Medicaid /    In time:2:05  Out time:3:00  Total Treatment Time (min): 55  Visit #: 13 of 16    Treatment Area: Low back pain [M54.5]    SUBJECTIVE  Pain Level (0-10 scale): 7  Any medication changes, allergies to medications, adverse drug reactions, diagnosis change, or new procedure performed?: [x] No    [] Yes (see summary sheet for update)  Subjective functional status/changes:   [] No changes reported  \"Achy and sharp/shooting pain with crossing legs. Always on the right side. \"    OBJECTIVE    35 min Therapeutic Exercise:  [x] See flow sheet :   Rationale: increase ROM, increase strength and improve coordination to improve the patients ability to perform daily activities with decreased pain and symptom levels     10 min Neuromuscular Re-education:  [x]  See flow sheet :  Oov exercises in supine including thoracic rotation, overhead and diagonals with 2# ball, knee lift, opp and same arm/leg lift   Rationale: increase ROM, increase strength, improve coordination, improve balance and increase proprioception  to improve the patients ability to perform daily activities with decreased pain and symptom levels    10 min Manual Therapy:  STM to tolerance to thoracic paraspinals, right QL   Rationale: decrease pain, increase ROM and increase tissue extensibility to perform daily activities with decreased pain and symptom levels          With   [] TE   [] TA   [] neuro   [] other: Patient Education: [x] Review HEP    [] Progressed/Changed HEP based on:   [] positioning   [] body mechanics   [] transfers   [] heat/ice application    [] other:      Other Objective/Functional Measures:   TTP over right thoracic paraspinals and right QL  Increased instability with lifting left LE on Oov       Pain Level (0-10 scale) post treatment: 7    ASSESSMENT/Changes in Function: Pt reports continued pain in low back with only relief when sitting in recliner with legs up. Pt demonstrating increased thoracic flexion with box lift needing consistent cues to correct form. Added thread the needle to HEP to help decrease pain. Patient will continue to benefit from skilled PT services to modify and progress therapeutic interventions, address functional mobility deficits, address ROM deficits, address strength deficits, analyze and cue movement patterns, analyze and modify body mechanics/ergonomics, assess and modify postural abnormalities and instruct in home and community integration to attain remaining goals. []  See Plan of Care  []  See progress note/recertification  []  See Discharge Summary         Progress towards goals / Updated goals:  Short Term Goals: To be accomplished in 3 weeks:                         7) Goal: Pt's LBP will decrease to 5/10 at worst and remain at midline of spine in order to better tolerate all ADLs. Status at initial evaluation: pain 10/10 at worst  Status at last progress note (11/09/17): max pain 7/10, progressing  Current status: max pain 7-8/10,overall pain ranging from 5-7/10, no improvement                            2) Goal: Innominate alignment will remain symmetric and stable in order to decrease pain and improve tolerance for ambulation and ADLs. Status at initial evaluation: anteriorly rotated right innominate (corrected on 10/11/17)  Status at last progress note (11/09/17): met (innominates aligned ) 10/26/17  Current status: good alignment, goal met                           3) Goal: Bilateral LEs radicular symptoms (feet numbness) will be centralized to the low back in order to demonstrate effectiveness of directional preference exercises and decrease risk of LE dysfunction.   Status at initial evaluation: intermittent bilateral feet numbness/tingling  Status at last progress note (11/09/17): intermittent numbness in 2nd-5th toes, unsure if related to LS dysfunction,   Current status: still reporting  Numbness in digits 3/4 both feet intermittently, likely not related to LBP, negative SLR, no radicular symptoms, goal met                            9) Goal: Pt will be independent and compliant with HEP to achieve other goals. Status at initial evaluation: pt is not independent with exercises  Status at last progress note (11/09/17): progressing (pt is 80% independent with current exercises) 10/26/17  Current status: patient has been sick lately and due to move has been unable to perform HEP on a regular basis, progressing  [address] be accomplished in 8 weeks:                         1) Goal: Abolish LBP and radicular symptoms in order to return to normal function. Status at initial evaluation: LBP 10/10 at worst with intermittent bilateral feet numbness/tingling  Status at last progress note (11/09/17): max pain reduced to 7/10 with ambulation, sweeping, bending  Current status: max pain 7-8/10 with ADL, no improvement since last tested                            2) Goal: Pt will be able to demonstrate ability to self manage LBP to 2/10 at worst during all activities in order to return to normal function. Status at initial evaluation: LBP 10/10 at worst  Status at last progress note (11/09/17): not reassessed  Current status: fair ability to self manage, pain up to 7-8/10 max, slow progress                          3) Updated goal as of 11/9/17: Patient using proper body mechanics with all material handling to minimize spinal loading and reduce pain.   Status at last progress note (11/09/17): habitual poor body mechanics with floor to waist lifting and sweeping motion  Current status: needs occasional verbal cues with floor to waist lift, slow progress    PLAN  [x]  Upgrade activities as tolerated     [x]  Continue plan of care  []  Update interventions per flow sheet       [] Discharge due to:_  []  Other:_      Alvarez Rinks 1/30/2018  2:06 PM    Future Appointments  Date Time Provider Rossy Isabella   1/30/2018 2:30 PM Alvarez Rinks [facility]   2/2/2018 9:30 AM Alvarez Rinks [facility]   2/2/2018 10:00 AM Alvarez Rinks [facility]   2/5/2018 9:30 AM Florance Neas Remesic [facility]   2/5/2018 10:00 AM Florance Neas Remesic [facility]   2/7/2018 10:00 AM Nithya Resendiz, PT [facility]   2/7/2018 10:30 AM Nithya Resendiz, PT [facility]   2/12/2018 9:30 AM Nithya Resendiz, PT [facility]   2/12/2018 10:00 AM Genie Maineobe Gerold, PT [facility]   2/14/2018 9:30 AM Nithya Resendiz, PT [facility]   2/14/2018 10:00 AM Genie Pheobe Gerold, PT [facility]   2/19/2018 9:30 AM Lethea Led A Remesic [facility]   2/19/2018 10:00 AM Lethea Led A Remesic [facility]   2/21/2018 9:30 AM Genie Maineobe Jaidenold, PT [facility]   2/21/2018 10:00 AM Genie Pheobe Jaidenold, PT [facility]

## 2018-01-30 NOTE — PROGRESS NOTES
PT DAILY TREATMENT NOTE     Patient Name: Alexander Ahmadi  WIVF:[id number]  : 1980  [x]  Patient  Verified  Payor: BLUE CROSS MEDICAID / Plan: [facility] HEALTHKEEPERS PLUS / Product Type: Managed Care Medicaid /    In time:3:00  Out time:3:20  Total Treatment Time (min): 20  Visit #: 3 of 12    Treatment Area: Bilateral wrist pain [M25.531, M25.532]    SUBJECTIVE  Pain Level (0-10 scale): 5 left  Any medication changes, allergies to medications, adverse drug reactions, diagnosis change, or new procedure performed?: [x] No    [] Yes (see summary sheet for update)  Subjective functional status/changes:   [] No changes reported  \"My left wrist hurts all the time. The right really only hurts when I write. \"    OBJECTIVE        20 min Therapeutic Exercise:  [x] See flow sheet :   Rationale: increase ROM, increase strength and improve coordination to improve the patients ability to perform daily activities with decreased pain and symptom levels          With   [] TE   [] TA   [] neuro   [] other: Patient Education: [x] Review HEP    [] Progressed/Changed HEP based on:   [] positioning   [] body mechanics   [] transfers   [] heat/ice application    [] other:      Other Objective/Functional Measures:   Fatigue with RD/UD with flex bar     Pain Level (0-10 scale) post treatment: 5 left, 0 right     ASSESSMENT/Changes in Function: Pt continues to c/o wrist howver tolerated exercises well. Session cut short today due to another appointment. Fatigue with web and red flex bar due to decreased strength. Able to carry 5# KB around clinic without increased in pain in wrists. Pt may benefit form scapular strengthening to decrease wrist pain.      Patient will continue to benefit from skilled PT services to modify and progress therapeutic interventions, address functional mobility deficits, address strength deficits, analyze and modify body mechanics/ergonomics, assess and modify postural abnormalities and instruct in home and community integration to attain remaining goals. []  See Plan of Care  []  See progress note/recertification  []  See Discharge Summary         Progress towards goals / Updated goals:  Short Term Goals: To be accomplished in 2 weeks:                        3. Patient will report compliance with HEP at least 1x/day to aid in rehabilitation program.                        UBROJR at IE: initial instruction                        Current: partial compliance                             2.Patient will display pain free AROM into 0-55 degrees wrist flexion to aid in completion of ADLs                        Status at IE: 0-45 B                        Current:NT          Long Term Goals: To be accomplished in 4- 6 weeks:                        1. Patient will report compliance with HEP a least 3-4x/week to aid in rehabilitation/strengthening program.                        CQZSYY at IE: NA                        Current:NT                           6.TMYFYDX will increase B  strength to 70lbs to aid in completion of ADLs.                       IWSARI at IE: 59.6 L, and 61.6 Rlbs                        Current:Left 50, right 55#                            2.RKSEIEL will increase B UE strength to 5/5 throughout all planes to aid in completion of ADLs.                         YUGWFV at IE: 4/5 in forearm sup/pronation and ulnar/radial deviation                        Current:NT                            4.Patient will increase FOTO score to 51 points overall to demonstrate improvement in functional status                         Status at Eval: 41/100                           5. Ability to perform ADL with pain <or= 3/10 max for increased function and tolerances                        Current status: pain ranging from 0-8/10 with ADL duarte sweeping, mopping       PLAN   [x]  Upgrade activities as tolerated     [x]  Continue plan of care  []  Update interventions per flow sheet       []  Discharge due to:_  [] Other:_      Javid Biggs Remesic 1/30/2018  2:07 PM    Future Appointments  Date Time Provider Rossy Mason   1/30/2018 2:30 PM Lucia Keep [facility]   2/2/2018 9:30 AM Lucia Keep [facility]   2/2/2018 10:00 AM Lucia Keep [facility]   2/5/2018 9:30 AM Javidkate Biggs Remesic [facility]   2/5/2018 10:00 AM Javidkate Biggs Remesic [facility]   2/7/2018 10:00 AM Braden Joseph PT [facility]   2/7/2018 10:30 AM Braden Joseph PT [facility]   2/12/2018 9:30 AM Braden Joseph PT [facility]   2/12/2018 10:00 AM Braden Joseph PT [facility]   2/14/2018 9:30 AM Braden Joseph PT [facility]   2/14/2018 10:00 AM Genie Colvin PT [facility]   2/19/2018 9:30 AM Zaina OWENS Remesic [facility]   2/19/2018 10:00 AM Zaina OWENS Remesic [facility]   2/21/2018 9:30 AM Braden Joseph PT [facility]   2/21/2018 10:00 AM Genie Colvin PT [facility]

## 2018-02-02 ENCOUNTER — APPOINTMENT (OUTPATIENT)
Dept: PHYSICAL THERAPY | Age: 38
End: 2018-02-02
Payer: MEDICAID

## 2018-02-02 ENCOUNTER — HOSPITAL ENCOUNTER (OUTPATIENT)
Dept: PHYSICAL THERAPY | Age: 38
End: 2018-02-02
Payer: MEDICAID

## 2018-02-05 ENCOUNTER — APPOINTMENT (OUTPATIENT)
Dept: PHYSICAL THERAPY | Age: 38
End: 2018-02-05
Payer: MEDICAID

## 2018-02-07 ENCOUNTER — HOSPITAL ENCOUNTER (OUTPATIENT)
Dept: PHYSICAL THERAPY | Age: 38
Discharge: HOME | End: 2018-02-07
Payer: MEDICAID

## 2018-02-07 PROCEDURE — 97140 MANUAL THERAPY 1/> REGIONS: CPT

## 2018-02-07 PROCEDURE — 97110 THERAPEUTIC EXERCISES: CPT

## 2018-02-07 NOTE — PROGRESS NOTES
PT DAILY TREATMENT NOTE     Patient Name: Radha Damico  Date:2018  : 1980  [x]  Patient  Verified  Payor: BLUE CROSS MEDICAID / Plan: [facility] HEALTHKEEPERS PLUS / Product Type: Managed Care Medicaid /    In time:1005  Out time:3:00  Total Treatment Time (min): 55  Visit #: 14 of 16    Treatment Area: Low back pain [M54.5]    SUBJECTIVE  Pain Level (0-10 scale): 7  Any medication changes, allergies to medications, adverse drug reactions, diagnosis change, or new procedure performed?: [x] No    [] Yes (see summary sheet for update)  Subjective functional status/changes:   [] No changes reported  \"My back is always hurting after I do some work in a bent over position    OBJECTIVE    30 min Therapeutic Exercise:  [x] See flow sheet :core strength   Rationale: increase ROM, increase strength and improve coordination to improve the patients ability to perform daily activities with decreased pain and symptom levels      min Neuromuscular Re-education:  [x]  See flow sheet :      Rationale: increase ROM, increase strength, improve coordination, improve balance and increase proprioception  to improve the patients ability to perform daily activities with decreased pain and symptom levels    10 min Manual Therapy:  STM to tolerance to thoracic paraspinals, right QL   Rationale: decrease pain, increase ROM and increase tissue extensibility to perform daily activities with decreased pain and symptom levels          With   [] TE   [] TA   [] neuro   [] other: Patient Education: [x] Review HEP    [] Progressed/Changed HEP based on:   [] positioning   [] body mechanics   [] transfers   [] heat/ice application    [] other:      Other Objective/Functional Measures:   TTP over right thoracic paraspinals and right QL  No pain with activities and improved tolerance to core ex  Reviewed proper body mechanics with housekeeping/ADL to prevent prolonged flexed working position       Pain Level (0-10 scale) post treatment: 0    ASSESSMENT/Changes in Function: Pt reports continued pain in low back with only relief when sitting in recliner with legs up. Pt demonstrating increased thoracic flexion with box lift needing consistent cues to correct form. Added thread the needle to HEP to help decrease pain. Patient will continue to benefit from skilled PT services to modify and progress therapeutic interventions, address functional mobility deficits, address ROM deficits, address strength deficits, analyze and cue movement patterns, analyze and modify body mechanics/ergonomics, assess and modify postural abnormalities and instruct in home and community integration to attain remaining goals. [x]  See Plan of Care  []  See progress note/recertification  []  See Discharge Summary         Progress towards goals / Updated goals:  Short Term Goals: To be accomplished in 3 weeks:                         2) Goal: Pt's LBP will decrease to 5/10 at worst and remain at midline of spine in order to better tolerate all ADLs. Status at initial evaluation: pain 10/10 at worst  Status at last progress note (11/09/17): max pain 7/10, progressing  Current status: max pain 7-8/10,overall pain ranging from 5-7/10, no improvement                            2) Goal: Innominate alignment will remain symmetric and stable in order to decrease pain and improve tolerance for ambulation and ADLs. Status at initial evaluation: anteriorly rotated right innominate (corrected on 10/11/17)  Status at last progress note (11/09/17): met (innominates aligned ) 10/26/17  Current status: good alignment, goal met                           3) Goal: Bilateral LEs radicular symptoms (feet numbness) will be centralized to the low back in order to demonstrate effectiveness of directional preference exercises and decrease risk of LE dysfunction.   Status at initial evaluation: intermittent bilateral feet numbness/tingling  Status at last progress note (11/09/17): intermittent numbness in 2nd-5th toes, unsure if related to LS dysfunction,   Current status: still reporting  Numbness in digits 3/4 both feet intermittently, likely not related to LBP, negative SLR, no radicular symptoms, goal met                            7) Goal: Pt will be independent and compliant with HEP to achieve other goals. Status at initial evaluation: pt is not independent with exercises  Status at last progress note (11/09/17): progressing (pt is 80% independent with current exercises) 10/26/17  Current status: patient has been sick lately and due to move has been unable to perform HEP on a regular basis, progressing  [address] be accomplished in 8 weeks:                         1) Goal: Abolish LBP and radicular symptoms in order to return to normal function. Status at initial evaluation: LBP 10/10 at worst with intermittent bilateral feet numbness/tingling  Status at last progress note (11/09/17): max pain reduced to 7/10 with ambulation, sweeping, bending  Current status: max pain 7-8/10 with ADL, no improvement since last tested                            2) Goal: Pt will be able to demonstrate ability to self manage LBP to 2/10 at worst during all activities in order to return to normal function. Status at initial evaluation: LBP 10/10 at worst  Status at last progress note (11/09/17): not reassessed  Current status: fair ability to self manage, pain up to 7-8/10 max, slow progress                          3) Updated goal as of 11/9/17: Patient using proper body mechanics with all material handling to minimize spinal loading and reduce pain.   Status at last progress note (11/09/17): habitual poor body mechanics with floor to waist lifting and sweeping motion  Current status: needs occasional verbal cues with floor to waist lift, slow progress    PLAN  [x]  Upgrade activities as tolerated     [x]  Continue plan of care  []  Update interventions per flow sheet       []  Discharge due to:_  []  Other:_      Nithya Resendiz PT 2/7/2018  2:06 PM    Future Appointments  Date Time Provider Rossy Isabella   2/9/2018 10:30 AM Alvarez Rinks [facility]   2/9/2018 11:00 AM Alvarez Rinks [facility]   2/12/2018 9:30 AM Nithya Resendiz, PT [facility]   2/12/2018 10:00 AM Nithya Resendiz, PT [facility]   2/14/2018 9:30 AM Nithya Resendiz, PT [facility]   2/14/2018 10:00 AM Genie Yost, PT [facility]   2/19/2018 9:30 AM Jose Carlos Led A Remesic [facility]   2/19/2018 10:00 AM Jose Carlos Led A Remesic [facility]   2/21/2018 9:30 AM Nithya Resendiz PT [facility]   2/21/2018 10:00 AM Genie Yost, PT [facility]

## 2018-02-07 NOTE — PROGRESS NOTES
PT DAILY TREATMENT NOTE     Patient Name: Radha Damico  Date:2018  : 1980  [x]  Patient  Verified  Payor: BLUE CROSS MEDICAID / Plan: [facility] HEALTHKEEPERS PLUS / Product Type: Managed Care Medicaid /    In time:1045  Out time:3:20  Total Treatment Time (min): 20  Visit #: 3 of 12    Treatment Area: Bilateral wrist pain [M25.531, M25.532]    SUBJECTIVE  Pain Level (0-10 scale): 0 both wrists  Any medication changes, allergies to medications, adverse drug reactions, diagnosis change, or new procedure performed?: [x] No    [] Yes (see summary sheet for update)  Subjective functional status/changes:   [] No changes reported  \"I t is still hard for me to write. Both wrist hurt. Most pain during or even more after the activity\"    OBJECTIVE        25 min Therapeutic Exercise:  [x] See flow sheet :hand grasping, fine motor skills including large screw manipulation   Rationale: increase ROM, increase strength and improve coordination to improve the patients ability to perform daily activities with decreased pain and symptom levels          With   [] TE   [] TA   [] neuro   [] other: Patient Education: [x] Review HEP    [] Progressed/Changed HEP based on:   [] positioning   [] body mechanics   [] transfers   [] heat/ice application    [] other:      Other Objective/Functional Measures:   Fatigue with sustained wrist activity, good tolerance to MPS activity  Less coordination on left hand, due to right hand dominance   Wrist Flex both to 60 deg  Pain increased to 5/10 left wrist with lat pull down/grasping    Pain Level (0-10 scale) post treatment: 5 left, 3 right     ASSESSMENT/Changes in Function: Pt continues to c/o wrist howver tolerated exercises well. Session cut short today due to another appointment. Fatigue with web and red flex bar due to decreased strength. Able to carry 5# KB around clinic without increased in pain in wrists.  Pt may benefit form scapular strengthening to decrease wrist pain. Patient will continue to benefit from skilled PT services to modify and progress therapeutic interventions, address functional mobility deficits, address strength deficits, analyze and modify body mechanics/ergonomics, assess and modify postural abnormalities and instruct in home and community integration to attain remaining goals. [x]  See Plan of Care  []  See progress note/recertification  []  See Discharge Summary         Progress towards goals / Updated goals:  Short Term Goals: To be accomplished in 2 weeks:                        2. Patient will report compliance with HEP at least 1x/day to aid in rehabilitation program.                        Status at IE: initial instruction                        Current: partial compliance                             2.Patient will display pain free AROM into 0-55 degrees wrist flexion to aid in completion of ADLs                        Status at IE: 0-45 B                        Current:0-60 both, goal met          Long Term Goals: To be accomplished in 4- 6 weeks:                        3. Patient will report compliance with HEP a least 3-4x/week to aid in rehabilitation/strengthening program.                        VUZHQE at IE: NA                        Current:NT                           3.UTLUZIV will increase B  strength to 70lbs to aid in completion of ADLs.                       AJYVSS at IE: 59.6 L, and 61.6 Rlbs                        Current:Left 50, right 55#                            7.XCRPNVY will increase B UE strength to 5/5 throughout all planes to aid in completion of ADLs.                         RDBRVK at IE: 4/5 in forearm sup/pronation and ulnar/radial deviation                        Current:NT                            4.Patient will increase FOTO score to 51 points overall to demonstrate improvement in functional status                         Status at Eval: 41/100                           5. Ability to perform ADL with pain <or= 3/10 max for increased function and tolerances                        Current status: pain ranging from 0-8/10 with ADL duarte sweeping, mopping       PLAN   [x]  Upgrade activities as tolerated     [x]  Continue plan of care  []  Update interventions per flow sheet       []  Discharge due to:_  []  Other:_      Pennie Leach, PT 2/7/2018  2:07 PM    Future Appointments  Date Time Provider Rossy Isabella   2/9/2018 10:30 AM Ulyess Kenna [facility]   2/9/2018 11:00 AM Ulyess Mins [facility]   2/12/2018 9:30 AM Genie Wilhelm, PT [facility]   2/12/2018 10:00 AM Pennie Leach, PT [facility]   2/14/2018 9:30 AM Pennie Leach, PT [facility]   2/14/2018 10:00 AM Genie Wilhelm, PT [facility]   2/19/2018 9:30 AM Ananth OWENS Remesic [facility]   2/19/2018 10:00 AM Ananth OWENS Remesic [facility]   2/21/2018 9:30 AM Pennie Leach PT [facility]   2/21/2018 10:00 AM Genie Wilhelm PT [facility]

## 2018-02-09 ENCOUNTER — HOSPITAL ENCOUNTER (OUTPATIENT)
Dept: PHYSICAL THERAPY | Age: 38
Discharge: HOME | End: 2018-02-09
Payer: MEDICAID

## 2018-02-09 PROCEDURE — 97110 THERAPEUTIC EXERCISES: CPT

## 2018-02-09 NOTE — PROGRESS NOTES
PT DAILY TREATMENT NOTE     Patient Name: Jess Last  Date:2018   : 1980  [x]  Patient  Verified  Payor: BLUE CROSS MEDICAID / Plan: [facility] [employer] HEALTHKEEPERS PLUS / Product Type: Managed Care Medicaid /    In time:11:08  Out time:11:31  Total Treatment Time (min): 23  Visit #: 5 of 12    Treatment Area: Bilateral wrist pain [M25.531, M25.532]    SUBJECTIVE  Pain Level (0-10 scale): 4  Any medication changes, allergies to medications, adverse drug reactions, diagnosis change, or new procedure performed?: [x] No    [] Yes (see summary sheet for update)  Subjective functional status/changes:   [] No changes reported  \"My left wrist still hurts the most. I try to move them around as much as I can to help with the pain and stiffness. \"    OBJECTIVE        23 min Therapeutic Exercise:  [x] See flow sheet :   Rationale: increase ROM, increase strength, improve coordination, improve balance and increase proprioception to improve the patients ability to perform daily activities with decreased pain and symptom levels          With   [] TE   [] TA   [] neuro   [] other: Patient Education: [x] Review HEP    [] Progressed/Changed HEP based on:   [] positioning   [] body mechanics   [] transfers   [] heat/ice application    [] other:      Other Objective/Functional Measures:   Cues to decrease shoulder hiking with screw assembly and lat pull downs  FOTO score 39/100     Pain Level (0-10 scale) post treatment: 4    ASSESSMENT/Changes in Function: Fatigue with lat pull downs due to scapular weakness. Cues for proper mechanics with screw assembly due to UT compensation and lateral leaning. FOTO score slight regression today possibly due to continued pain in both wrists.      Patient will continue to benefit from skilled PT services to modify and progress therapeutic interventions, address functional mobility deficits, address ROM deficits, address strength deficits, analyze and modify body mechanics/ergonomics, assess and modify postural abnormalities and instruct in home and community integration to attain remaining goals. []  See Plan of Care  []  See progress note/recertification  []  See Discharge Summary         Progress towards goals / Updated goals:  Short Term Goals: To be accomplished in 2 weeks:                        0. Patient will report compliance with HEP at least 1x/day to aid in rehabilitation program.                        Status at IE: initial instruction                        Current: partial compliance                             2.Patient will display pain free AROM into 0-55 degrees wrist flexion to aid in completion of ADLs                        Status at IE: 0-45 B                        Current:0-60 both, goal met          Long Term Goals: To be accomplished in 4- 6 weeks:                        1. Patient will report compliance with HEP a least 3-4x/week to aid in rehabilitation/strengthening program.                        KMFAII at IE: NA                        Current: Partial compliance                             2.Patient will increase B  strength to 70lbs to aid in completion of ADLs.                       BVLYTE at IE: 59.6 L, and 61.6 Rlbs                        Current:Left 50, right 55#                            1.VSXCVXX will increase B UE strength to 5/5 throughout all planes to aid in completion of ADLs.                         AAVHIP at IE: 4/5 in forearm sup/pronation and ulnar/radial deviation                        Current:NT                            4.Patient will increase FOTO score to 51 points overall to demonstrate improvement in functional status                         Status at Eval: 41/100    Current: 39/100, slight regression                             5. Ability to perform ADL with pain <or= 3/10 max for increased function and tolerances                        Current status: pain ranging from 0-8/10 with ADL duarte sweeping, mopping        PLAN  [x]  Upgrade activities as tolerated     [x]  Continue plan of care  []  Update interventions per flow sheet       []  Discharge due to:_  []  Other:_      Adriannapaloma Doris 2/9/2018  11:30 AM    Future Appointments  Date Time Provider Rossy Mason   2/12/2018 9:30 AM Eboni Danger, PT [facility]   2/12/2018 10:00 AM Genie crowell, PT [facility]   2/14/2018 9:30 AM Eboni Danger, PT [facility]   2/14/2018 10:00 AM Genie crowell, PT [facility]   2/19/2018 9:30 AM Carles Bulgarian A Remesic [facility]   2/19/2018 10:00 AM Carles Bulgarian A Remesic [facility]   2/21/2018 9:30 AM Eboni Danger, PT [facility]   2/21/2018 10:00 AM Chato crowell, PT [facility]

## 2018-02-09 NOTE — PROGRESS NOTES
PT DAILY TREATMENT NOTE     Patient Name: Freddie Trujillo  Date:2018  : 1980  [x]  Patient  Verified  Payor: BLUE CROSS MEDICAID / Plan: [facility] HEALTHKEEPERS PLUS / Product Type: Managed Care Medicaid /    In time:10:35  Out time:11:08  Total Treatment Time (min): 33  Visit #: 15 of 16    Treatment Area: Low back pain [M54.5]    SUBJECTIVE  Pain Level (0-10 scale): 5  Any medication changes, allergies to medications, adverse drug reactions, diagnosis change, or new procedure performed?: [x] No    [] Yes (see summary sheet for update)  Subjective functional status/changes:   [] No changes reported  \"My back still hurts. I really haven't noticed a difference since starting therapy. \"    OBJECTIVE      33 min Therapeutic Exercise:  [x] See flow sheet :   Rationale: increase ROM, increase strength, improve coordination and increase proprioception to improve the patients ability to perform daily activities with decreased pain and symptom levels    With   [] TE   [] TA   [] neuro   [] other: Patient Education: [x] Review HEP    [] Progressed/Changed HEP based on:   [] positioning   [] body mechanics   [] transfers   [] heat/ice application    [] other:      Other Objective/Functional Measures:   Cues for proper form with box lift to decreased thoracic flexion and shoulder hiking     Pain Level (0-10 scale) post treatment: 5    ASSESSMENT/Changes in Function: Continues to report no change in symptoms. Demonstrates continued poor mechnics with box lift however able to improve mechanics with last lift. Patient will continue to benefit from skilled PT services to modify and progress therapeutic interventions, address functional mobility deficits, address ROM deficits, address strength deficits, analyze and modify body mechanics/ergonomics, assess and modify postural abnormalities and instruct in home and community integration to attain remaining goals.      []  See Plan of Care  []  See progress note/recertification  []  See Discharge Summary         Progress towards goals / Updated goals:  Short Term Goals: To be accomplished in 3 weeks:                         4) Goal: Pt's LBP will decrease to 5/10 at worst and remain at midline of spine in order to better tolerate all ADLs. Status at initial evaluation: pain 10/10 at worst  Status at last progress note (11/09/17): max pain 7/10, progressing  Current status: max pain 7-8/10,overall pain ranging from 5-7/10, no improvement                            2) Goal: Innominate alignment will remain symmetric and stable in order to decrease pain and improve tolerance for ambulation and ADLs. Status at initial evaluation: anteriorly rotated right innominate (corrected on 10/11/17)  Status at last progress note (11/09/17): met (innominates aligned ) 10/26/17  Current status: good alignment, goal met                            3) Goal: Bilateral LEs radicular symptoms (feet numbness) will be centralized to the low back in order to demonstrate effectiveness of directional preference exercises and decrease risk of LE dysfunction. Status at initial evaluation: intermittent bilateral feet numbness/tingling  Status at last progress note (11/09/17): intermittent numbness in 2nd-5th toes, unsure if related to LS dysfunction,   Current status: still reporting  Numbness in digits 3/4 both feet intermittently, likely not related to LBP, negative SLR, no radicular symptoms, goal met                            3) Goal: Pt will be independent and compliant with HEP to achieve other goals.   Status at initial evaluation: pt is not independent with exercises  Status at last progress note (11/09/17): progressing (pt is 80% independent with current exercises) 10/26/17  Current status: patient has been sick lately and due to move has been unable to perform HEP on a regular basis, progressing  [address] be accomplished in 8 weeks:                         1) Goal: Abolish LBP and radicular symptoms in order to return to normal function. Status at initial evaluation: LBP 10/10 at worst with intermittent bilateral feet numbness/tingling  Status at last progress note (11/09/17): max pain reduced to 7/10 with ambulation, sweeping, bending  Current status: max pain 7-8/10 with ADL, no improvement since last tested                            5) Goal: Pt will be able to demonstrate ability to self manage LBP to 2/10 at worst during all activities in order to return to normal function. Status at initial evaluation: LBP 10/10 at worst  Status at last progress note (11/09/17): not reassessed  Current status: fair ability to self manage, pain up to 7-8/10 max, slow progress                          3) Updated goal as of 11/9/17: Patient using proper body mechanics with all material handling to minimize spinal loading and reduce pain.   Status at last progress note (11/09/17): habitual poor body mechanics with floor to waist lifting and sweeping motion  Current status: needs occasional verbal cues with floor to waist lift, slow progress    PLAN  [x]  Upgrade activities as tolerated     [x]  Continue plan of care  []  Update interventions per flow sheet       []  Discharge due to:_  []  Other:_      Boubacar Ortiz 2/9/2018  10:57 AM    Future Appointments  Date Time Provider Rossy Mason   2/9/2018 11:00 AM Boubacar PERRY [facility]   2/12/2018 9:30 AM MICHAEL Okeefe [facility]   2/12/2018 10:00 [facility]   2/14/2018 9:30 AM MICHAEL Castillo [facility]   2/14/2018 10:00 AM MICHAEL Okeefe [facility]   2/19/2018 9:30 AM Lucia HERMANHPKATELIN [facility]   2/19/2018 10:00 AM Lucia PERRY [facility]   2/21/2018 9:30 AM MICHAEL Castillo [facility]   2/21/2018 10:00 AM Genie Wu PT [facility]

## 2018-02-12 ENCOUNTER — APPOINTMENT (OUTPATIENT)
Dept: PHYSICAL THERAPY | Age: 38
End: 2018-02-12
Payer: MEDICAID

## 2018-02-12 ENCOUNTER — HOSPITAL ENCOUNTER (OUTPATIENT)
Dept: PHYSICAL THERAPY | Age: 38
Discharge: HOME | End: 2018-02-12
Payer: MEDICAID

## 2018-02-12 PROCEDURE — 97110 THERAPEUTIC EXERCISES: CPT

## 2018-02-12 NOTE — PROGRESS NOTES
PT DAILY TREATMENT NOTE     Patient Name: Mukund Eugene  Date:2018   : 1980  [x]  Patient  Verified  Payor: BLUE CROSS MEDICAID / Plan: [facility] HEALTHKEEPERS PLUS / Product Type: Managed Care Medicaid /    In time:1005  Out time:1045  Total Treatment Time (min): 40  Visit #: 6 of 12    Treatment Area: Bilateral wrist pain [M25.531, M25.532]    SUBJECTIVE  Pain Level (0-10 scale): left 2, right 4-5  Any medication changes, allergies to medications, adverse drug reactions, diagnosis change, or new procedure performed?: [x] No    [] Yes (see summary sheet for update)  Subjective functional status/changes:   [] No changes reported  My right hand is hurting me today  Holding pain causes pain in my hand and wrist in either hand  Swelling of both hands in AM upon waking. OBJECTIVE        40 min Therapeutic Exercise:  [x] See flow sheet :focus on functional strength, initiated partial WB on hands through ladder push ups   Rationale: increase ROM, increase strength, improve coordination, improve balance and increase proprioception to improve the patients ability to perform daily activities with decreased pain and symptom levels          With   [] TE   [] TA   [] neuro   [] other: Patient Education: [x] Review HEP    [] Progressed/Changed HEP based on:   [] positioning   [] body mechanics   [] transfers   [] heat/ice application    [] other:      Other Objective/Functional Measures:   Not working yet  Fatigue with sustained activities, also mild increase in pain with sustained activity using flexbar  Increased ulnar wrist pain right with ladder push ups    Pain Level (0-10 scale) post treatment: left wrist 5, right 6-7    ASSESSMENT/Changes in Function: Fatigue with lat pull downs due to scapular weakness. Cues for proper mechanics with screw assembly due to UT compensation and lateral leaning. FOTO score slight regression today possibly due to continued pain in both wrists.      Patient will continue to benefit from skilled PT services to modify and progress therapeutic interventions, address functional mobility deficits, address ROM deficits, address strength deficits, analyze and modify body mechanics/ergonomics, assess and modify postural abnormalities and instruct in home and community integration to attain remaining goals. [x]  See Plan of Care  []  See progress note/recertification  []  See Discharge Summary         Progress towards goals / Updated goals:  Short Term Goals: To be accomplished in 2 weeks:                        1. Patient will report compliance with HEP at least 1x/day to aid in rehabilitation program.                        Status at IE: initial instruction                        Current: partial compliance                             2.Patient will display pain free AROM into 0-55 degrees wrist flexion to aid in completion of ADLs                        Status at IE: 0-45 B                        Current:0-60 both, goal met          Long Term Goals: To be accomplished in 4- 6 weeks:                        4. Patient will report compliance with HEP a least 3-4x/week to aid in rehabilitation/strengthening program.                        FMLACA at IE: NA                        Current: Partial compliance                             2.Patient will increase B  strength to 70lbs to aid in completion of ADLs.                       CXIZCH at IE: 59.6 L, and 61.6 Rlbs                        Current:Left 50, right 55#                            3.PNLZTWJ will increase B UE strength to 5/5 throughout all planes to aid in completion of ADLs.                         IUHWKF at IE: 4/5 in forearm sup/pronation and ulnar/radial deviation                        Current:NT                            4.Patient will increase FOTO score to 51 points overall to demonstrate improvement in functional status                         Status at Eval: 41/100    Current: 39/100, slight regression                           7. Ability to perform ADL with pain <or= 3/10 max for increased function and tolerances                        Current status: pain ranging from 0-8/10 with ADL duarte sweeping, mopping        PLAN  [x]  Upgrade activities as tolerated     [x]  Continue plan of care  []  Update interventions per flow sheet       []  Discharge due to:_  []  Other:_      Brigitte Charles PT 2/12/2018  11:30 AM    Future Appointments  Date Time Provider Rossy Mason   2/14/2018 9:30 AM MICHAEL FariasHPKATELIN [facility]   2/14/2018 10:00 AM MICHAEL LambHPKATELIN [facility]   2/19/2018 9:30 AM Paul OWENS Remesic [facility]   2/19/2018 10:00 AM Klaus Aguilar Remesic [facility]   2/21/2018 9:30 AM MICHAEL FariasHPKATELIN [facility]   2/21/2018 10:00 AM Yesica crowell PT [facility]

## 2018-02-14 ENCOUNTER — HOSPITAL ENCOUNTER (OUTPATIENT)
Dept: PHYSICAL THERAPY | Age: 38
Discharge: HOME | End: 2018-02-14
Payer: MEDICAID

## 2018-02-14 ENCOUNTER — APPOINTMENT (OUTPATIENT)
Dept: PHYSICAL THERAPY | Age: 38
End: 2018-02-14
Payer: MEDICAID

## 2018-02-14 PROCEDURE — 97110 THERAPEUTIC EXERCISES: CPT

## 2018-02-14 NOTE — PROGRESS NOTES
PT DAILY TREATMENT NOTE     Patient Name: Radha Dmaico  Date:2018   : 1980  [x]  Patient  Verified  Payor: BLUE CROSS MEDICAID / Plan: [facility] HEALTHKEEPERS PLUS / Product Type: Managed Care Medicaid /    In time:130  Out time:217  Total Treatment Time (min): 40  Visit #: 6 of 12    Treatment Area: Bilateral wrist pain [M25.531, M25.532]    SUBJECTIVE  Pain Level (0-10 scale): left 2, right 4-5  Any medication changes, allergies to medications, adverse drug reactions, diagnosis change, or new procedure performed?: [x] No    [] Yes (see summary sheet for update)  Subjective functional status/changes:   [] No changes reported  My right hand is hurting me today  Holding pain causes pain in my hand and wrist in either hand  Swelling of both hands in AM upon waking. OBJECTIVE        40 min Therapeutic Exercise:  [x] See flow sheet :focus on functional strength, material handling and fine motor activities   Rationale: increase ROM, increase strength, improve coordination, improve balance and increase proprioception to improve the patients ability to perform daily activities with decreased pain and symptom levels          With   [] TE   [] TA   [] neuro   [] other: Patient Education: [x] Review HEP    [] Progressed/Changed HEP based on:   [] positioning   [] body mechanics   [] transfers   [] heat/ice application    [] other:      Other Objective/Functional Measures:   Fatigue with sustained activities duarte manipulation of small screws, reports increase in left wrist and thenar pain   No antalgia with activities, good speed with fine motor skills    Pain Level (0-10 scale) post treatment: left wrist 5, right 4    ASSESSMENT/Changes in Function: Fatigue with lat pull downs due to scapular weakness. Cues for proper mechanics with screw assembly due to UT compensation and lateral leaning. FOTO score slight regression today possibly due to continued pain in both wrists.      Patient will continue to benefit from skilled PT services to modify and progress therapeutic interventions, address functional mobility deficits, address ROM deficits, address strength deficits, analyze and modify body mechanics/ergonomics, assess and modify postural abnormalities and instruct in home and community integration to attain remaining goals. [x]  See Plan of Care  []  See progress note/recertification  []  See Discharge Summary         Progress towards goals / Updated goals:  Short Term Goals: To be accomplished in 2 weeks:                        0. Patient will report compliance with HEP at least 1x/day to aid in rehabilitation program.                        Status at IE: initial instruction                        Current: partial compliance                             2.Patient will display pain free AROM into 0-55 degrees wrist flexion to aid in completion of ADLs                        Status at IE: 0-45 B                        Current:0-60 both, goal met          Long Term Goals: To be accomplished in 4- 6 weeks:                        9. Patient will report compliance with HEP a least 3-4x/week to aid in rehabilitation/strengthening program.                        UZOEQT at IE: NA                        Current: Partial compliance                             2.Patient will increase B  strength to 70lbs to aid in completion of ADLs.                       JNJLGJ at IE: 59.6 L, and 61.6 Rlbs                        Current:Left 50, right 55#                            1.AIGBTPW will increase B UE strength to 5/5 throughout all planes to aid in completion of ADLs.                         HOZYSN at IE: 4/5 in forearm sup/pronation and ulnar/radial deviation                        Current:NT                            4.Patient will increase FOTO score to 51 points overall to demonstrate improvement in functional status                         Status at Eval: 41/100    Current: 39/100, slight regression                           1. Ability to perform ADL with pain <or= 3/10 max for increased function and tolerances                        Current status: pain ranging from 0-8/10 with ADL duarte sweeping, mopping        PLAN  [x]  Upgrade activities as tolerated     [x]  Continue plan of care  []  Update interventions per flow sheet       []  Discharge due to:_  []  Other:_      Lona Larios, PT 2/14/2018  11:30 AM    Future Appointments  Date Time Provider Rossy Mason   2/19/2018 9:30 AM Florie Phoenix [facility]   2/19/2018 10:00 AM Florie Phoenix [facility]   2/21/2018 9:30 AM MICHAEL Santa [facility]   2/21/2018 10:00 AM Jaun Fitzpatrick, MICHAEL PERRY [facility]

## 2018-02-15 ENCOUNTER — APPOINTMENT (OUTPATIENT)
Dept: PHYSICAL THERAPY | Age: 38
End: 2018-02-15
Payer: MEDICAID

## 2018-02-19 ENCOUNTER — HOSPITAL ENCOUNTER (OUTPATIENT)
Dept: PHYSICAL THERAPY | Age: 38
Discharge: HOME | End: 2018-02-19
Payer: MEDICAID

## 2018-02-19 PROCEDURE — 97110 THERAPEUTIC EXERCISES: CPT

## 2018-02-19 NOTE — PROGRESS NOTES
[facility] [address] [place], [address] [place]  Phone: [phone]      Fax:  [phone]    Physical Therapy Progress Note  Patient name: Greg Ness Start of Care: 10/11/17   Referral source: Robi Kam MD : 1980   Medical/Treatment Diagnosis: Low back pain [M54.5] Onset Date:13     Prior Hospitalization: see medical history Provider#: [id number]   Medications: Verified on Patient Summary List    Comorbidities: depression, T-spine compression fracture with Schmorl's nodes, OA, tobacco use  Prior Level of Function:no deficits, independent with ADLs    Visits from Start of Care: 16    Missed Visits: [address] be accomplished in 3 weeks:                         0) Goal: Pt's LBP will decrease to 5/10 at worst and remain at midline of spine in order to better tolerate all ADLs. Status at initial evaluation: pain 10/10 at worst  Status at last progress note (17): max pain 7/10, progressing  Current status: max pain 7-8/10,overall pain ranging from 5-7/10, no improvement                            2) Goal: Innominate alignment will remain symmetric and stable in order to decrease pain and improve tolerance for ambulation and ADLs. Status at initial evaluation: anteriorly rotated right innominate (corrected on 10/11/17)  Status at last progress note (17): met (innominates aligned ) 10/26/17  Current status: good alignment, goal met                            3) Goal: Bilateral LEs radicular symptoms (feet numbness) will be centralized to the low back in order to demonstrate effectiveness of directional preference exercises and decrease risk of LE dysfunction.   Status at initial evaluation: intermittent bilateral feet numbness/tingling  Status at last progress note (17): intermittent numbness in 2nd-5th toes, unsure if related to LS dysfunction,   Current status: still reporting  Numbness in digits 3/4 both feet intermittently, likely not related to LBP, negative SLR, no radicular symptoms, goal met                            6) Goal: Pt will be independent and compliant with HEP to achieve other goals. Status at initial evaluation: pt is not independent with exercises  Status at last progress note (11/09/17): progressing (pt is 80% independent with current exercises) 10/26/17  Current status: pt repots 1x a day goal MET      Long Term Goals: To be accomplished in 8 weeks:                         0) Goal: Abolish LBP and radicular symptoms in order to return to normal function. Status at initial evaluation: LBP 10/10 at worst with intermittent bilateral feet numbness/tingling  Status at last progress note (11/09/17): max pain reduced to 7/10 with ambulation, sweeping, bending  Current status: max pain 7-8/10 with ADL, no improvement since last tested                            6) Goal: Pt will be able to demonstrate ability to self manage LBP to 2/10 at worst during all activities in order to return to normal function. Status at initial evaluation: LBP 10/10 at worst  Status at last progress note (11/09/17): not reassessed  Current status: fair ability to self manage, pain up to 7-8/10 max, slow progress                          3) Updated goal as of 11/9/17: Patient using proper body mechanics with all material handling to minimize spinal loading and reduce pain. Status at last progress note (11/09/17): habitual poor body mechanics with floor to waist lifting and sweeping motion  Current status: needs occasional verbal cues with floor to waist lift, slow progress         Key Functional Changes: Pt is making some progress towards goals however slowly. Pt reports no change in symptoms or pain since starting therapy however slight decrease in pain with lifting due to better mechanics.  Continues to report increased pain with sweeping and mopping especially right side up to 7/10.      Patient will continue to benefit from skilled PT services to modify and progress therapeutic interventions, address functional mobility deficits, address strength deficits, analyze and cue movement patterns, analyze and modify body mechanics/ergonomics, assess and modify postural abnormalities and instruct in home and community integration to attain remaining goals.     Updated Goals: to be achieved in 3 weeks:   See unmet above    ASSESSMENT/RECOMMENDATIONS:  [x]Continue therapy per initial plan/protocol at a frequency of  2 x per week for 3 weeks  []Continue therapy with the following recommended changes:_____________________      _____________________________________________________________________  []Discontinue therapy progressing towards or have reached established goals  []Discontinue therapy due to lack of appreciable progress towards goals  []Discontinue therapy due to lack of attendance or compliance  []Await Physician's recommendations/decisions regarding therapy  []Other:________________________________________________________________    Thank you for this referral.   Leoncio Ramey 2/19/2018 12:18 PM    NOTE TO PHYSICIAN:  PLEASE COMPLETE THE ORDERS BELOW AND   FAX TO [facility]: [phone]  If you are unable to process this request in 24 hours please contact our office: [phone]      ____ I have read the above report and request that my patient continue therapy with the following changes/special instructions:  ____ I have read the above report and request that my patient be discharged from therapy    Physician's Signature:_____________________ Date:___________Time:__________

## 2018-02-19 NOTE — PROGRESS NOTES
PT DAILY TREATMENT NOTE     Patient Name: Ilya Rust  Date:2018  : 1980  [x]  Patient  Verified  Payor: BLUE CROSS MEDICAID / Plan: [facility] HEALTHKEEPERS PLUS / Product Type: Managed Care Medicaid /    In time:9:58  Out time:10:20  Total Treatment Time (min): 22  Visit #: 7 of 16    Treatment Area: Bilateral wrist pain [M25.531, M25.532]    SUBJECTIVE  Pain Level (0-10 scale): 4.5  Any medication changes, allergies to medications, adverse drug reactions, diagnosis change, or new procedure performed?: [x] No    [] Yes (see summary sheet for update)  Subjective functional status/changes:   [] No changes reported  \"My wrists are too bad today. I weird doing the screws with my left hand since I'm right handed. \"    OBJECTIVE        22 min Therapeutic Exercise:  [x] See flow sheet :   Rationale: increase ROM, increase strength and improve coordination to improve the patients ability to perform daily activities with decreased pain and symptom levels    With   [] TE   [] TA   [] neuro   [] other: Patient Education: [x] Review HEP    [] Progressed/Changed HEP based on:   [] positioning   [] body mechanics   [] transfers   [] heat/ice application    [] other:      Other Objective/Functional Measures:   Cues to decreased shoulder hiking with screw assembly     Pain Level (0-10 scale) post treatment: 5    ASSESSMENT/Changes in Function: Increased right shoulder pain with overhead ball toss today however able to continue. Patient will continue to benefit from skilled PT services to modify and progress therapeutic interventions, address functional mobility deficits, address ROM deficits, address strength deficits, analyze and modify body mechanics/ergonomics, assess and modify postural abnormalities and instruct in home and community integration to attain remaining goals.      []  See Plan of Care  []  See progress note/recertification  []  See Discharge Summary         Progress towards goals / Updated goals:  Short Term Goals: To be accomplished in 2 weeks:                        7. Patient will report compliance with HEP at least 1x/day to aid in rehabilitation program.                        Status at IE: initial instruction                        Current: partial compliance                             2.Patient will display pain free AROM into 0-55 degrees wrist flexion to aid in completion of ADLs                        Status at IE: 0-45 B                        Current:0-60 both, goal met          Long Term Goals: To be accomplished in 4- 6 weeks:                        5. Patient will report compliance with HEP a least 3-4x/week to aid in rehabilitation/strengthening program.                        NZVJGW at IE: NA                        Current: Partial compliance                             2.Patient will increase B  strength to 70lbs to aid in completion of ADLs.                       HPTDSM at IE: 59.6 L, and 61.6 Rlbs                        Current:Left 50, right 55#                            8.LXOKFTM will increase B UE strength to 5/5 throughout all planes to aid in completion of ADLs.                       ZBSCHH at IE: 4/5 in forearm sup/pronation and ulnar/radial deviation                        Current:  Will reassess next visit                             4.Patient will increase FOTO score to 51 points overall to demonstrate improvement in functional status                         Status at Eval: 41/100                                              Current: 39/100, slight regression                             5. Ability to perform ADL with pain <or= 3/10 max for increased function and tolerances                        Current status: pain ranging from 0-8/10 with ADL duarte sweeping, mopping        PLAN  [x]  Upgrade activities as tolerated     [x]  Continue plan of care  []  Update interventions per flow sheet       []  Discharge due to:_  []  Other:_      Fahad Huerta Tanvir 2/19/2018  10:01 AM    Future Appointments  Date Time Provider Rossy Mason   2/21/2018 9:30 AM Genie crowell, MICHAEL PERRY [facility]   2/21/2018 10:00 AM MICHAEL Lamb [facility]

## 2018-02-19 NOTE — PROGRESS NOTES
PT DAILY TREATMENT NOTE     Patient Name: Ilya Rust  Date:2018  : 1980  [x]  Patient  Verified  Payor: BLUE CROSS MEDICAID / Plan: [facility] HEALTHKEEPERS PLUS / Product Type: Managed Care Medicaid /    In time:9:30  Out time:9:57  Total Treatment Time (min): 27  Visit #: 16 of 16    Treatment Area: Low back pain [M54.5]    SUBJECTIVE  Pain Level (0-10 scale): 6  Any medication changes, allergies to medications, adverse drug reactions, diagnosis change, or new procedure performed?: [x] No    [] Yes (see summary sheet for update)  Subjective functional status/changes:   [] No changes reported  \"My back feels about the same. I do correct how I lift things now and it helps a little. I want to keep coming for a couple weeks to see if it will help. \"    OBJECTIVE    27 min Therapeutic Exercise:  [x] See flow sheet :   Rationale: increase ROM, increase strength and improve coordination to improve the patients ability to perform daily activities with decreased pain and symptom levels          With   [] TE   [] TA   [] neuro   [] other: Patient Education: [x] Review HEP    [] Progressed/Changed HEP based on:   [] positioning   [] body mechanics   [] transfers   [] heat/ice application    [] other:      Other Objective/Functional Measures:   See updated goals below  Cues needed to decreased thoracic flexion when picking up box     Pain Level (0-10 scale) post treatment: 5    ASSESSMENT/Changes in Function: Pt is making some progress towards goals however slowly. Pt reports no change in symptoms or pain since starting therapy however slight decrease in pain with lifting due to better mechanics. Continues to report increased pain with sweeping and mopping especially right side up to 7/10.      Patient will continue to benefit from skilled PT services to modify and progress therapeutic interventions, address functional mobility deficits, address strength deficits, analyze and cue movement patterns, analyze and modify body mechanics/ergonomics, assess and modify postural abnormalities and instruct in home and community integration to attain remaining goals. []  See Plan of Care  []  See progress note/recertification  []  See Discharge Summary         Progress towards goals / Updated goals:  Short Term Goals: To be accomplished in 3 weeks:                         9) Goal: Pt's LBP will decrease to 5/10 at worst and remain at midline of spine in order to better tolerate all ADLs. Status at initial evaluation: pain 10/10 at worst  Status at last progress note (11/09/17): max pain 7/10, progressing  Current status: max pain 7-8/10,overall pain ranging from 5-7/10, no improvement                            2) Goal: Innominate alignment will remain symmetric and stable in order to decrease pain and improve tolerance for ambulation and ADLs. Status at initial evaluation: anteriorly rotated right innominate (corrected on 10/11/17)  Status at last progress note (11/09/17): met (innominates aligned ) 10/26/17  Current status: good alignment, goal met                            3) Goal: Bilateral LEs radicular symptoms (feet numbness) will be centralized to the low back in order to demonstrate effectiveness of directional preference exercises and decrease risk of LE dysfunction. Status at initial evaluation: intermittent bilateral feet numbness/tingling  Status at last progress note (11/09/17): intermittent numbness in 2nd-5th toes, unsure if related to LS dysfunction,   Current status: still reporting  Numbness in digits 3/4 both feet intermittently, likely not related to LBP, negative SLR, no radicular symptoms, goal met                            1) Goal: Pt will be independent and compliant with HEP to achieve other goals.   Status at initial evaluation: pt is not independent with exercises  Status at last progress note (11/09/17): progressing (pt is 80% independent with current exercises) 10/26/17  Current status: pt repots 1x a day goal MET      Long Term Goals: To be accomplished in 8 weeks:                         1) Goal: Abolish LBP and radicular symptoms in order to return to normal function. Status at initial evaluation: LBP 10/10 at worst with intermittent bilateral feet numbness/tingling  Status at last progress note (11/09/17): max pain reduced to 7/10 with ambulation, sweeping, bending  Current status: max pain 7-8/10 with ADL, no improvement since last tested                            2) Goal: Pt will be able to demonstrate ability to self manage LBP to 2/10 at worst during all activities in order to return to normal function. Status at initial evaluation: LBP 10/10 at worst  Status at last progress note (11/09/17): not reassessed  Current status: fair ability to self manage, pain up to 7-8/10 max, slow progress                          3) Updated goal as of 11/9/17: Patient using proper body mechanics with all material handling to minimize spinal loading and reduce pain.   Status at last progress note (11/09/17): habitual poor body mechanics with floor to waist lifting and sweeping motion  Current status: needs occasional verbal cues with floor to waist lift, slow progress    PLAN  [x]  Upgrade activities as tolerated     [x]  Continue plan of care  []  Update interventions per flow sheet       []  Discharge due to:_  []  Other:_      [facility] 2/19/2018  9:48 AM    Future Appointments  Date Time Provider Rossy Mason   2/19/2018 10:00 AM Roro HERMANHPKATELIN [facility]   2/21/2018 9:30 AM Janet Gill, PT [facility]   2/21/2018 10:00 AM Shravan Mello Chrissie Serum, PT [facility]

## 2018-02-21 ENCOUNTER — APPOINTMENT (OUTPATIENT)
Dept: PHYSICAL THERAPY | Age: 38
End: 2018-02-21
Payer: MEDICAID

## 2018-02-27 ENCOUNTER — HOSPITAL ENCOUNTER (OUTPATIENT)
Dept: PHYSICAL THERAPY | Age: 38
Discharge: HOME | End: 2018-02-27
Payer: MEDICAID

## 2018-02-27 PROCEDURE — 97530 THERAPEUTIC ACTIVITIES: CPT

## 2018-02-27 PROCEDURE — 97110 THERAPEUTIC EXERCISES: CPT

## 2018-02-27 NOTE — PROGRESS NOTES
PT DAILY TREATMENT NOTE     Patient Name: Andrea Hay  CAROLINE:  : 1980  [x]  Patient  Verified  Payor: BLUE CROSS MEDICAID / Plan: [facility] HEALTHKEEPERS PLUS / Product Type: Managed Care Medicaid /    In time:9:33  Out time:10:20  Total Treatment Time (min): 47  Visit #: 9 of 12    Treatment Area: Bilateral wrist pain [M25.531, M25.532]    SUBJECTIVE  Pain Level (0-10 scale): 2  Any medication changes, allergies to medications, adverse drug reactions, diagnosis change, or new procedure performed?: [x] No    [] Yes (see summary sheet for update)  Subjective functional status/changes:   [] No changes reported  \"My wrist are getting better but I still get pain especially in the right on the outside. 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 \"    OBJECTIVE      37 min Therapeutic Exercise:  [x] See flow sheet :   Rationale: increase ROM, increase strength and improve coordination to improve the patients ability to perform daily activities with decreased pain and symptom levels    10 min Therapeutic Activity:  [x]  See flow sheet : KB carry, ball toss, pt education on posture, mechanics with lifting    Rationale: increase ROM, increase strength, improve coordination and increase proprioception  to improve the patients ability to perform daily activities with decreased pain and symptom levels     With   [] TE   [] TA   [] neuro   [] other: Patient Education: [x] Review HEP    [] Progressed/Changed HEP based on:   [] positioning   [] body mechanics   [] transfers   [] heat/ice application    [] other:      Other Objective/Functional Measures:   See updated goals below   FOTO score 47/100     Pain Level (0-10 scale) post treatment: 1    ASSESSMENT/Changes in Function: Pt is making progress towards goals with improved  strength, ROM, and improved compliance with HEP. Subjective improvement as well with improved FOTO score.  Pt reports 75% improvement since starting therapy with writing, holding objects for long time still difficult. Max pain 5/10 now with right > left wrist over ulnar side. Scapular strength still decreased with right > left with cues needed to decreased hiking with exercises. Pt would benefit from continued therapy for 2 more weeks to address decreased scapular strength, lifting mechanics and overall pain to complete daily functional activities with increased ease. Patient will continue to benefit from skilled PT services to modify and progress therapeutic interventions, address functional mobility deficits, address strength deficits, analyze and cue movement patterns, analyze and modify body mechanics/ergonomics, assess and modify postural abnormalities and instruct in home and community integration to attain remaining goals. []  See Plan of Care  []  See progress note/recertification  []  See Discharge Summary         Progress towards goals / Updated goals:  Short Term Goals: To be accomplished in 2 weeks:                         2. Patient will report compliance with HEP at least 1x/day to aid in rehabilitation program.                        Status at IE: initial instruction                        Current: partial compliance goal Met                            2.Patient will display pain free AROM into 0-55 degrees wrist flexion to aid in completion of ADLs                        Status at IE: 0-45 B                        Current:0-60 both, goal met          Long Term Goals: To be accomplished in 4- 6 weeks:                        3. Patient will report compliance with HEP a least 3-4x/week to aid in rehabilitation/strengthening program.                        YERVFL at IE: NA                        Current: compliance per pt report goal Met                            2.Patient will increase B  strength to 70lbs to aid in completion of ADLs.                         KONVQD at IE: 59.6 L, and 61.6 Rlbs                        Current:right 77# right, 70# left goal Met                            9.BYKRZYS will increase B UE strength to 5/5 throughout all planes to aid in completion of ADLs.                         SBNUWM at IE: 4/5 in forearm sup/pronation and ulnar/radial deviation                        Current: 4/5 right shoulder, 4+/5 left shoulder, 5/5 right wirist, 4/5 left supination progressing                            4.Patient will increase FOTO score to 51 points overall to demonstrate improvement in functional status                         Status at Eval: 41/100                                              Current: 47/100 progressing                             8. Ability to perform ADL with pain <or= 3/10 max for increased function and tolerances                        Current status: pain ranging from 0-8/10 with ADL duarte sweeping, mopping    @e-eval 2/27/18: 5/10 max pain  Progressing         PLAN  [x]  Upgrade activities as tolerated     [x]  Continue plan of care  []  Update interventions per flow sheet       []  Discharge due to:_  []  Other:_      Arsen Ramey 2/27/2018  9:44 AM    Future Appointments  Date Time Provider Rossy Mason   2/28/2018 11:00 AM Cassandra George PT [facility]

## 2018-02-27 NOTE — PROGRESS NOTES
[facility] in [facility]. Colleen Salazar, [address] [place] [address]  Phone: [phone]      Fax:  [phone]    Physical Therapy Progress Note  Patient name: Romana Britain Start of Care: 17   Referral source: Amber Griffin MD : 1980   Medical/Treatment Diagnosis: Bilateral wrist pain [M25.531, M25.532] Onset Date:5 years     Prior Hospitalization: see medical history Provider#: [id number]   Medications: Verified on Patient Summary List    Comorbidities: depression, OA, tobacco use  Prior Level of Function:unrestricted     Visits from Start of Care: 9    Missed Visits: [address] be accomplished in 2 weeks:                         1. Patient will report compliance with HEP at least 1x/day to aid in rehabilitation program.                        Status at IE: initial instruction                        Current: partial compliance goal Met                            2.Patient will display pain free AROM into 0-55 degrees wrist flexion to aid in completion of ADLs                        Status at IE: 0-45 B                        Current:0-60 both, goal met          Long Term Goals: To be accomplished in 4- 6 weeks:                        0. Patient will report compliance with HEP a least 3-4x/week to aid in rehabilitation/strengthening program.                        YQHFUP at IE: NA                        Current: compliance per pt report goal Met                            2.Patient will increase B  strength to 70lbs to aid in completion of ADLs.                       QOKABM at IE: 59.6 L, and 61.6 Rlbs                        Current:right 77# right, 70# left goal Met                            3.Patient will increase B UE strength to 5/5 throughout all planes to aid in completion of ADLs.                         JOHXFC at IE: 4/5 in forearm sup/pronation and ulnar/radial deviation                        Current: 4/5 right shoulder, 4+/5 left shoulder, 5/5 right wirist, 4/5 left supination progressing                            9.NRMUFRP will increase FOTO score to 51 points overall to demonstrate improvement in functional status                         Status at Eval: 41/100                                              Current: 47/100 progressing                             5. Ability to perform ADL with pain <or= 3/10 max for increased function and tolerances                        Current status: pain ranging from 0-8/10 with ADL duarte sweeping, mopping                                              @e-eval 2/27/18: 5/10 max pain  Progressing     Key Functional Changes: Pt is making progress towards goals with improved  strength, ROM, and improved compliance with HEP. Subjective improvement as well with improved FOTO score. Pt reports 75% improvement since starting therapy with writing, holding objects for long time still difficult. Max pain 5/10 now with right > left wrist over ulnar side. Scapular strength still decreased with right > left with cues needed to decreased hiking with exercises. Pt would benefit from continued therapy for 2 more weeks to address decreased scapular strength, lifting mechanics and overall pain to complete daily functional activities with increased ease.       Updated Goals: to be achieved in 2 weeks:   See unmet above    ASSESSMENT/RECOMMENDATIONS:  [x]Continue therapy per initial plan/protocol at a frequency of  2 x per week for 2 weeks  []Continue therapy with the following recommended changes:_____________________      _____________________________________________________________________  []Discontinue therapy progressing towards or have reached established goals  []Discontinue therapy due to lack of appreciable progress towards goals  []Discontinue therapy due to lack of attendance or compliance  []Await Physician's recommendations/decisions regarding therapy  []Other:________________________________________________________________    Thank you for this referral.   [facility] 2/27/2018 10:30 AM    NOTE TO PHYSICIAN:  PLEASE COMPLETE THE ORDERS BELOW AND   FAX TO [facility]: [phone]  If you are unable to process this request in 24 hours please contact our office: [phone]      ____ I have read the above report and request that my patient continue therapy with the following changes/special instructions:  ____ I have read the above report and request that my patient be discharged from therapy    Physician's Signature:_____________________ Date:___________Time:__________

## 2018-02-28 ENCOUNTER — HOSPITAL ENCOUNTER (OUTPATIENT)
Dept: PHYSICAL THERAPY | Age: 38
Discharge: HOME | End: 2018-02-28
Payer: MEDICAID

## 2018-02-28 PROCEDURE — 97112 NEUROMUSCULAR REEDUCATION: CPT

## 2018-02-28 PROCEDURE — 97110 THERAPEUTIC EXERCISES: CPT

## 2018-02-28 NOTE — PROGRESS NOTES
PT DAILY TREATMENT NOTE     Patient Name: Yuly Arrington  Date:2018  : 1980  [x]  Patient  Verified  Payor: BLUE CROSS MEDICAID / Plan: [facility] HEALTHKEEPERS PLUS / Product Type: Managed Care Medicaid /    In time:9:58  Out time:10:20  Total Treatment Time (min): 22  Visit #: 17 of 16 + 6    Treatment Area: Low back pain [M54.5]    SUBJECTIVE  Pain Level (0-10 scale): 5-6  Any medication changes, allergies to medications, adverse drug reactions, diagnosis change, or new procedure performed?: [x] No    [] Yes (see summary sheet for update)  Subjective functional status/changes:   [] No changes reported  \"My back still hurts with prolonged standing and lying. Going back to work on Monday at [employer]. 5 4 hour days. Today most pain in right LB.  \"    OBJECTIVE        25 min Therapeutic Exercise:  [x] See flow sheet :   Rationale: increase ROM, increase strength and improve coordination to improve the patients ability to perform daily activities with decreased pain and symptom levels    25 min neuromuscular Reeducation: with Oov and DE for core activation and LS stabilization, instruction in JOHANN stretching /stance for ES inhibition    With   [] TE   [] TA   [] neuro   [] other: Patient Education: [x] Review HEP    [] Progressed/Changed HEP based on:   [] positioning   [] body mechanics   [] transfers   [] heat/ice application    [] other:      Other Objective/Functional Measures:   Reports right>left wrist pain with Down dog/plank combo-inchworm  LS overcompensation with DE ex, improved with verbal cues     Pain Level (0-10 scale) post treatment: 2    ASSESSMENT/Changes in Function: Good tolerance to ex, reduction in pain, overall more upbeat attitude    Patient will continue to benefit from skilled PT services to modify and progress therapeutic interventions, address functional mobility deficits, address ROM deficits, address strength deficits, analyze and modify body mechanics/ergonomics, assess and modify postural abnormalities and instruct in home and community integration to attain remaining goals. [x]  See Plan of Care  []  See progress note/recertification  []  See Discharge Summary             Short Term Goals: To be accomplished in 3 weeks:                         8) Goal: Pt's LBP will decrease to 5/10 at worst and remain at midline of spine in order to better tolerate all ADLs. Status at initial evaluation: pain 10/10 at worst  Status at last progress note (11/09/17): max pain 7/10, progressing  Current status: max pain 7-8/10,overall pain ranging from 5-7/10, no improvement                            2) Goal: Innominate alignment will remain symmetric and stable in order to decrease pain and improve tolerance for ambulation and ADLs. Status at initial evaluation: anteriorly rotated right innominate (corrected on 10/11/17)  Status at last progress note (11/09/17): met (innominates aligned ) 10/26/17  Current status: good alignment, goal met                            3) Goal: Bilateral LEs radicular symptoms (feet numbness) will be centralized to the low back in order to demonstrate effectiveness of directional preference exercises and decrease risk of LE dysfunction. Status at initial evaluation: intermittent bilateral feet numbness/tingling  Status at last progress note (11/09/17): intermittent numbness in 2nd-5th toes, unsure if related to LS dysfunction,   Current status: still reporting  Numbness in digits 3/4 both feet intermittently, likely not related to LBP, negative SLR, no radicular symptoms, goal met                            6) Goal: Pt will be independent and compliant with HEP to achieve other goals.   Status at initial evaluation: pt is not independent with exercises  Status at last progress note (11/09/17): progressing (pt is 80% independent with current exercises) 10/26/17  Current status: pt repots 1x a day goal MET      Long Term Goals: To be accomplished in 8 weeks:                         5) Goal: Abolish LBP and radicular symptoms in order to return to normal function. Status at initial evaluation: LBP 10/10 at worst with intermittent bilateral feet numbness/tingling  Status at last progress note (11/09/17): max pain reduced to 7/10 with ambulation, sweeping, bending  Current status: max pain 7-8/10 with ADL, no improvement since last tested                            4) Goal: Pt will be able to demonstrate ability to self manage LBP to 2/10 at worst during all activities in order to return to normal function. Status at initial evaluation: LBP 10/10 at worst  Status at last progress note (11/09/17): not reassessed  Current status: fair ability to self manage, pain up to 7-8/10 max, slow progress                          3) Updated goal as of 11/9/17: Patient using proper body mechanics with all material handling to minimize spinal loading and reduce pain. Status at last progress note (11/09/17): habitual poor body mechanics with floor to waist lifting and sweeping motion  Current status: needs occasional verbal cues with floor to waist lift, slow progress           Key Functional Changes: Pt is making some progress towards goals however slowly. Pt reports no change in symptoms or pain since starting therapy however slight decrease in pain with lifting due to better mechanics.  Continues to report increased pain with sweeping and mopping especially right side up to 7/10.       Patient will continue to benefit from skilled PT services to modify and progress therapeutic interventions, address functional mobility deficits, address strength deficits, analyze and cue movement patterns, analyze and modify body mechanics/ergonomics, assess and modify postural abnormalities and instruct in home and community integration to attain remaining goals.     Updated Goals: to be achieved in 3 weeks:                        See unmet above  PLAN  [x]  Upgrade activities as tolerated [x]  Continue plan of care  []  Update interventions per flow sheet       []  Discharge due to:_  []  Other:_      Eboni Lopez, PT 2/28/2018  10:01 AM    Future Appointments  Date Time Provider Rossy Mason   3/6/2018 10:00 AM Diane PERRY [facility]   3/8/2018 9:00 AM Diane PERRY [facility]

## 2018-03-06 ENCOUNTER — HOSPITAL ENCOUNTER (OUTPATIENT)
Dept: PHYSICAL THERAPY | Age: 38
Discharge: HOME | End: 2018-03-06
Payer: MEDICAID

## 2018-03-06 ENCOUNTER — APPOINTMENT (OUTPATIENT)
Dept: PHYSICAL THERAPY | Age: 38
End: 2018-03-06
Payer: MEDICAID

## 2018-03-06 PROCEDURE — 97530 THERAPEUTIC ACTIVITIES: CPT

## 2018-03-06 PROCEDURE — 97110 THERAPEUTIC EXERCISES: CPT

## 2018-03-06 NOTE — PROGRESS NOTES
PT DAILY TREATMENT NOTE     Patient Name: Keshawn Anna  Date:3/6/2018  : 1980  [x]  Patient  Verified  Payor: BLUE CROSS MEDICAID / Plan: [facility] HEALTHKEEPERS PLUS / Product Type: Managed Care Medicaid /    In time:10:05  Out time:10:50  Total Treatment Time (min): 45  Visit #: 10 of 12    Treatment Area: Bilateral wrist pain [M25.531, M25.532]    SUBJECTIVE  Pain Level (0-10 scale): 6  Any medication changes, allergies to medications, adverse drug reactions, diagnosis change, or new procedure performed?: [x] No    [] Yes (see summary sheet for update)  Subjective functional status/changes:   [] No changes reported  \"I moved this weekend so my wrists and right shoudler are sore. \"    OBJECTIVE      35 min Therapeutic Exercise:  [x] See flow sheet :   Rationale: increase ROM, increase strength and improve coordination to improve the patients ability to perform daily activities with decreased pain and symptom levels    10 min Therapeutic Activity:  [x]  See flow sheet : box carry ,KB carry, pegs    Rationale: increase ROM, increase strength and improve coordination  to improve the patients ability to perform daily activities with decreased pain and symptom levels           With   [] TE   [] TA   [] neuro   [] other: Patient Education: [x] Review HEP    [] Progressed/Changed HEP based on:   [] positioning   [] body mechanics   [] transfers   [] heat/ice application    [] other:      Other Objective/Functional Measures:   Cues to decreased shoulder hiking with pegs     Pain Level (0-10 scale) post treatment: 5    ASSESSMENT/Changes in Function: Pt needing cues to keep wrist in neutral with box carry to decrease pain. Continues to be challenged with scapular strengthening exercises.      Patient will continue to benefit from skilled PT services to modify and progress therapeutic interventions, address functional mobility deficits, address strength deficits, analyze and modify body mechanics/ergonomics, assess and modify postural abnormalities and instruct in home and community integration to attain remaining goals. []  See Plan of Care  []  See progress note/recertification  []  See Discharge Summary         Progress towards goals / Updated goals:  Long Term Goals: To be accomplished in 4- 6 weeks:                       7. Patient will increase B  strength to 70lbs to aid in completion of ADLs.                       GNMEBQ at IE: 59.6 L, and 61.6 Rlbs                        Last PN:right 77# right, 70# left    Current: fatigue with 20# carry                             3.Patient will increase B UE strength to 5/5 throughout all planes to aid in completion of ADLs.                       XMYDQZ at IE: 4/5 in forearm sup/pronation and ulnar/radial deviation                        Last PN: 4/5 right shoulder, 4+/5 left shoulder, 5/5 right wirist, 4/5 left supination    Current: NT                            4.Patient will increase FOTO score to 51 points overall to demonstrate improvement in functional status                         Status at Eval: 41/100                         Last PN: 47/100     Current: NT                            5. Ability to perform ADL with pain <or= 3/10 max for increased function and tolerances                        Current status: pain ranging from 0-8/10 with ADL duarte sweeping, mopping  [email or web address] 2/27/18: 5/10 max pain      Current: 6/10 pain with moving this weekend.      PLAN  [x]  Upgrade activities as tolerated     [x]  Continue plan of care  []  Update interventions per flow sheet       []  Discharge due to:_  []  Other:_      Abhishek Freeman 3/6/2018  10:24 AM    Future Appointments  Date Time Provider Rossy Mason   3/8/2018 9:00 AM Abhishek Freeman [facility]

## 2018-03-08 ENCOUNTER — HOSPITAL ENCOUNTER (OUTPATIENT)
Dept: PHYSICAL THERAPY | Age: 38
Discharge: HOME | End: 2018-03-08
Payer: MEDICAID

## 2018-03-08 PROCEDURE — 97110 THERAPEUTIC EXERCISES: CPT

## 2018-03-08 NOTE — PROGRESS NOTES
PT DAILY TREATMENT NOTE     Patient Name: Vilma Posey  Date:3/8/2018  : 1980  [x]  Patient  Verified  Payor: BLUE CROSS MEDICAID / Plan: [facility] [email or web address] PL[facility] / Product Type: Managed Care Medicaid /    In time:9:05  Out time:9:50  Total Treatment Time (min): 45  Visit #: 18 of 22    Treatment Area: Low back pain [M54.5]    SUBJECTIVE  Pain Level (0-10 scale): 4  Any medication changes, allergies to medications, adverse drug reactions, diagnosis change, or new procedure performed?: [x] No    [] Yes (see summary sheet for update)  Subjective functional status/changes:   [] No changes reported  \"Back is feeling better. Changing my lifting techniques has really helped. \"    OBJECTIVE    45 min Therapeutic Exercise:  [x] See flow sheet :   Rationale: increase ROM, increase strength and improve coordination to improve the patients ability to perform daily activities with decreased pain and symptom levels    With   [] TE   [] TA   [] neuro   [] other: Patient Education: [x] Review HEP    [] Progressed/Changed HEP based on:   [] positioning   [] body mechanics   [] transfers   [] heat/ice application    [] other:      Other Objective/Functional Measures:   Improved form with box lift however cues to keep box closer to body     Pain Level (0-10 scale) post treatment: 3    ASSESSMENT/Changes in Function: Pt reports 70% improvement in back since starting therapy. Pain at max 8/10 over the weekend from moving however daily pain overall is decreasing . Improved form and body mechanics noted with box lift with decreased vebal cues needed today. Pt wanting to wait MD recommendation on continuing with therapy.     Patient will continue to benefit from skilled PT services to modify and progress therapeutic interventions, address functional mobility deficits, address strength deficits, analyze and modify body mechanics/ergonomics, assess and modify postural abnormalities and instruct in home and community integration to attain remaining goals. []  See Plan of Care  []  See progress note/recertification  []  See Discharge Summary         Progress towards goals / Updated goals:  Short Term Goals: To be accomplished in 3 weeks:                         5) Goal: Pt's LBP will decrease to 5/10 at worst and remain at midline of spine in order to better tolerate all ADLs. Status at initial evaluation: pain 10/10 at worst  Status at last progress note (11/09/17): max pain 7/10, progressing  Current status: max pain 7-8/10,overall pain ranging from 5-7/10, no improvement                            2) Goal: Innominate alignment will remain symmetric and stable in order to decrease pain and improve tolerance for ambulation and ADLs. Status at initial evaluation: anteriorly rotated right innominate (corrected on 10/11/17)  Status at last progress note (11/09/17): met (innominates aligned ) 10/26/17  Current status: good alignment, goal met                            3) Goal: Bilateral LEs radicular symptoms (feet numbness) will be centralized to the low back in order to demonstrate effectiveness of directional preference exercises and decrease risk of LE dysfunction. Status at initial evaluation: intermittent bilateral feet numbness/tingling  Status at last progress note (11/09/17): intermittent numbness in 2nd-5th toes, unsure if related to LS dysfunction,   Current status: still reporting  Numbness in digits 3/4 both feet intermittently, likely not related to LBP, negative SLR, no radicular symptoms, goal met                            2) Goal: Pt will be independent and compliant with HEP to achieve other goals.   Status at initial evaluation: pt is not independent with exercises  Status at last progress note (11/09/17): progressing (pt is 80% independent with current exercises) 10/26/17  Current status: pt repots 1x a day goal MET  [address] be accomplished in 8 weeks:                         7) Goal: Abolish LBP and radicular symptoms in order to return to normal function. Status at initial evaluation: LBP 10/10 at worst with intermittent bilateral feet numbness/tingling  Status at last progress note (11/09/17): max pain reduced to 7/10 with ambulation, sweeping, bending  Current status: max pain 7-8/10 with ADL, no improvement since last tested                            6) Goal: Pt will be able to demonstrate ability to self manage LBP to 2/10 at worst during all activities in order to return to normal function. Status at initial evaluation: LBP 10/10 at worst  Status at last progress note (11/09/17): not reassessed  Current status: fair ability to self manage, pain up to 7-8/10 max, slow progress                          3) Updated goal as of 11/9/17: Patient using proper body mechanics with all material handling to minimize spinal loading and reduce pain. Status at last progress note (11/09/17): habitual poor body mechanics with floor to waist lifting and sweeping motion  Current status: needs occasional verbal cues with floor to waist lift, slow progress                 PLAN  [x]  Upgrade activities as tolerated     [x]  Continue plan of care  []  Update interventions per flow sheet       []  Discharge due to:_  []  Other:_      Kiki Vragas 3/8/2018  9:07 AM    No future appointments.

## 2018-03-08 NOTE — PROGRESS NOTES
[facility] in [address] [place], [address] [place]  Phone: [phone]      Fax:  [phone]    Physical Therapy Progress Note  Patient name: Bentley Powell Start of Care: 10/11/17   Referral source: Megan Collins MD : 1980   Medical/Treatment Diagnosis: Low back pain [M54.5] Onset Date:13     Prior Hospitalization: see medical history Provider#: [id number]   Medications: Verified on Patient Summary List    Comorbidities: depression, T-spine compression fracture with Schmorl's nodes, OA, tobacco use  Prior Level of Function: no deficits, independent with ADLs     Visits from Start of Care: 18    Missed Visits: [address] be accomplished in 3 weeks:                         4) Goal: Pt's LBP will decrease to 5/10 at worst and remain at midline of spine in order to better tolerate all ADLs. Status at initial evaluation: pain 10/10 at worst  Status at last progress note (17): max pain 7/10, progressing  Current status: max pain 7-8/10,overall pain ranging from 5-7/10, no improvement                            2) Goal: Innominate alignment will remain symmetric and stable in order to decrease pain and improve tolerance for ambulation and ADLs. Status at initial evaluation: anteriorly rotated right innominate (corrected on 10/11/17)  Status at last progress note (17): met (innominates aligned ) 10/26/17  Current status: good alignment, goal met                            3) Goal: Bilateral LEs radicular symptoms (feet numbness) will be centralized to the low back in order to demonstrate effectiveness of directional preference exercises and decrease risk of LE dysfunction.   Status at initial evaluation: intermittent bilateral feet numbness/tingling  Status at last progress note (17): intermittent numbness in 2nd-5th toes, unsure if related to LS dysfunction,   Current status: still reporting  Numbness in digits 3/4 both feet intermittently, likely not related to LBP, negative SLR, no radicular symptoms, goal met                            2) Goal: Pt will be independent and compliant with HEP to achieve other goals. Status at initial evaluation: pt is not independent with exercises  Status at last progress note (11/09/17): progressing (pt is 80% independent with current exercises) 10/26/17  Current status: pt repots 1x a day goal MET      Long Term Goals: To be accomplished in 8 weeks:                         5) Goal: Abolish LBP and radicular symptoms in order to return to normal function. Status at initial evaluation: LBP 10/10 at worst with intermittent bilateral feet numbness/tingling  Status at last progress note (11/09/17): max pain reduced to 7/10 with ambulation, sweeping, bending  Current status: max pain 7-8/10 with ADL, no improvement                             0) Goal: Pt will be able to demonstrate ability to self manage LBP to 2/10 at worst during all activities in order to return to normal function. Status at initial evaluation: LBP 10/10 at worst  Status at last progress note (11/09/17): not reassessed  Current status: fair ability to self manage, pain up to 7-8/10 max, slow progress                          3) Updated goal as of 11/9/17: Patient using proper body mechanics with all material handling to minimize spinal loading and reduce pain. Status at last progress note (11/09/17): habitual poor body mechanics with floor to waist lifting and sweeping motion  Current status: needs occasional verbal cues with floor to waist lift progressing       Key Functional Changes: Pt reports 70% improvement in back since starting therapy however slow progress towards goals due to poor attendance. Pain at max 8/10 over the weekend from moving however daily pain overall is decreasing . Improved form and body mechanics noted with box lift with decreased vebal cues needed today.  Pt wanting to wait MD recommendation on continuing with therapy.      Updated Goals: to be achieved in 2 weeks:   See unmet above    ASSESSMENT/RECOMMENDATIONS:   [x]Continue therapy per initial plan/protocol at a frequency of  2 x per week for 2 weeks  []Continue therapy with the following recommended changes:_____________________      _____________________________________________________________________  []Discontinue therapy progressing towards or have reached established goals  []Discontinue therapy due to lack of appreciable progress towards goals  []Discontinue therapy due to lack of attendance or compliance  [x]Await Physician's recommendations/decisions regarding therapy  []Other:________________________________________________________________    Thank you for this referral.   Claudene Hick Remesic 3/8/2018 11:52 AM    NOTE TO PHYSICIAN:  PLEASE COMPLETE THE ORDERS BELOW AND   [facility]: [phone]  If you are unable to process this request in 24 hours please contact our office: [phone]      ____ I have read the above report and request that my patient continue therapy with the following changes/special instructions:  ____ I have read the above report and request that my patient be discharged from therapy    Physician's Signature:_____________________ Date:___________Time:__________

## 2018-03-28 ENCOUNTER — APPOINTMENT (OUTPATIENT)
Dept: PHYSICAL THERAPY | Age: 38
End: 2018-03-28
Payer: MEDICAID

## 2018-04-03 ENCOUNTER — HOSPITAL ENCOUNTER (OUTPATIENT)
Dept: PHYSICAL THERAPY | Age: 38
Discharge: HOME | End: 2018-04-03
Payer: MEDICAID

## 2018-04-03 PROCEDURE — 97110 THERAPEUTIC EXERCISES: CPT
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
## 2018-04-05 ENCOUNTER — HOSPITAL ENCOUNTER (OUTPATIENT)
Dept: PHYSICAL THERAPY | Age: 38
Discharge: HOME | End: 2018-04-05
Payer: MEDICAID

## 2018-04-05 PROCEDURE — 97110 THERAPEUTIC EXERCISES: CPT

## 2018-04-05 NOTE — PROGRESS NOTES
[facility] in [address] [place], [address] [place]  Phone: [phone]      Fax:  [phone]    Physical Therapy Progress Note  Patient name: Miryam Glynn Start of Care: 10/11/17   Referral source: Joey Velazquez MD : 1980   Medical/Treatment Diagnosis: Low back pain [M54.5] Onset Date:13     Prior Hospitalization: see medical history Provider#: [id number]   Medications: Verified on Patient Summary List    Comorbidities: depression, T-spine compression fracture with Schmorl's nodes, OA, tobacco use  Prior Level of Function:no deficits, independent with ADLs    Visits from Start of Care: 19    Missed Visits: [address] be accomplished in 3 weeks:                         1) Goal: Pt's LBP will decrease to 5/10 at worst and remain at midline of spine in order to better tolerate all ADLs. Status at initial evaluation: pain 10/10 at worst  Status at last progress note (17): max pain 7/10, progressing  Last PN: max pain 7-8/10,overall pain ranging from 5-7/10  Current: 5/10 max over weekend with increased pain with sweeping and mopping  progressing           Long Term Goals: To be accomplished in 8 weeks:                         1) Goal: Abolish LBP and radicular symptoms in order to return to normal function. Status at initial evaluation: LBP 10/10 at worst with intermittent bilateral feet numbness/tingling  Status at last progress note (17): max pain reduced to 7/10 with ambulation, sweeping, bending  Last PN: max pain 7-8/10 with ADL  Current:  no longer c/o bilateral LE numbness and tingling goal Met                            3) Goal: Pt will be able to demonstrate ability to self manage LBP to 2/10 at worst during all activities in order to return to normal function.   Status at initial evaluation: LBP 10/10 at worst  Status at last progress note (17): not reassessed  Last PN: fair ability to self manage, pain up to 7-8/10 max  Current: ability to manage fairly however continued 4-7/10 pain progressing                           3) Updated goal as of 11/9/17: Patient using proper body mechanics with all material handling to minimize spinal loading and reduce pain. Status at last progress note (11/09/17): habitual poor body mechanics with floor to waist lifting and sweeping motion  LAst PN: needs occasional verbal cues with floor to waist lift   Current: good mechanics, able to self correct goal Met       Key Functional Changes: Pt is making some progress towards goals however poor compliance with therapy over last month due to personal issues. Pain ranges 4-7/10 still in right lower back depending on activities such as heavy chores. Denies numbness and tingling in bilateral LE. Lumbar ROM WFL with slight increase in pain with lumbar extension. Lifting mechanics has improved as well with being able to self correct posture. Plan to continue PT for 4 more weeks or until the end of the month with pt self managing pain on own with HEP.      Updated Goals: to be achieved in 4 weeks:   See unmet above    ASSESSMENT/RECOMMENDATIONS:  [x]Continue therapy per initial plan/protocol at a frequency of  2 x per week for 4 weeks  []Continue therapy with the following recommended changes:_____________________      _____________________________________________________________________  []Discontinue therapy progressing towards or have reached established goals  []Discontinue therapy due to lack of appreciable progress towards goals  []Discontinue therapy due to lack of attendance or compliance  []Await Physician's recommendations/decisions regarding therapy  []Other:________________________________________________________________    Thank you for this referral.   Joel Ramey 4/5/2018 11:39 AM    NOTE TO PHYSICIAN:  PLEASE COMPLETE THE ORDERS BELOW AND   FAX TO [facility]: [phone]  If you are unable to process this request in 24 hours please contact our office: [phone]      ____ I have read the above report and request that my patient continue therapy with the following changes/special instructions:  ____ I have read the above report and request that my patient be discharged from therapy    Physician's Signature:_____________________ Date:___________Time:__________

## 2018-04-05 NOTE — PROGRESS NOTES
PT DAILY TREATMENT NOTE     Patient Name: Rosa Strong  Date:2018  : 1980  [x]  Patient  Verified  Payor: BLUE CROSS MEDICAID / Plan: [facility] HEALTHKEEPERS PLUS / Product Type: Managed Care Medicaid /    In time:9:25  Out time:10:12  Total Treatment Time (min): 52  Visit #: 20 of 22    Treatment Area: Low back pain [M54.5]    SUBJECTIVE  Pain Level (0-10 scale): 6  Any medication changes, allergies to medications, adverse drug reactions, diagnosis change, or new procedure performed?: [x] No    [] Yes (see summary sheet for update)  Subjective functional status/changes:   [] No changes reported  \"My back still hurts on the right lower side. \"    OBJECTIVE      47 min Therapeutic Exercise:  [x] See flow sheet :   Rationale: increase ROM, increase strength and increase proprioception to improve the patients ability to perform daily activities with decreased pain and symptom levels          With   [] TE   [] TA   [] neuro   [] other: Patient Education: [x] Review HEP    [] Progressed/Changed HEP based on:   [] positioning   [] body mechanics   [] transfers   [] heat/ice application    [] other:      Other Objective/Functional Measures:   Lumbar ROM WFL  FOTO score 32/100 with most difficulty completing household chores and walking for long periods     Pain Level (0-10 scale) post treatment: 4    ASSESSMENT/Changes in Function: Pain ranges 4-7/10 still in right lower back depending on activities such as heavy chores. Denies numbness and tingling in bilateral LE. Lumbar ROM WFL with slight increase in pain with lumbar extension. Lifting mechanics has improved as well with being able to self correct posture. Plan to continue PT for 4 more weeks or until the end of the month with pt self managing pain on own with HEP.      Patient will continue to benefit from skilled PT services to modify and progress therapeutic interventions, address functional mobility deficits, address ROM deficits, address strength deficits, analyze and address soft tissue restrictions, analyze and cue movement patterns, analyze and modify body mechanics/ergonomics and instruct in home and community integration to attain remaining goals. []  See Plan of Care  []  See progress note/recertification  []  See Discharge Summary         Progress towards goals / Updated goals:  Short Term Goals: To be accomplished in 3 weeks:                         8) Goal: Pt's LBP will decrease to 5/10 at worst and remain at midline of spine in order to better tolerate all ADLs. Status at initial evaluation: pain 10/10 at worst  Status at last progress note (11/09/17): max pain 7/10, progressing  Last PN: max pain 7-8/10,overall pain ranging from 5-7/10  Current: 5/10 max over weekend with increased pain with sweeping and mopping  progressing           Long Term Goals: To be accomplished in 8 weeks:                         1) Goal: Abolish LBP and radicular symptoms in order to return to normal function. Status at initial evaluation: LBP 10/10 at worst with intermittent bilateral feet numbness/tingling  Status at last progress note (11/09/17): max pain reduced to 7/10 with ambulation, sweeping, bending  Last PN: max pain 7-8/10 with ADL  Current:  no longer c/o bilateral LE numbness and tingling goal Met                            0) Goal: Pt will be able to demonstrate ability to self manage LBP to 2/10 at worst during all activities in order to return to normal function. Status at initial evaluation: LBP 10/10 at worst  Status at last progress note (11/09/17): not reassessed  Last PN: fair ability to self manage, pain up to 7-8/10 max  Current: ability to manage fairly however continued 4-7/10 pain progressing                           3) Updated goal as of 11/9/17: Patient using proper body mechanics with all material handling to minimize spinal loading and reduce pain.   Status at last progress note (11/09/17): habitual poor body mechanics with floor to waist lifting and sweeping motion  LAst PN: needs occasional verbal cues with floor to waist lift   Current: good mechanics, able to self correct goal Met    PLAN  [x]  Upgrade activities as tolerated     [x]  Continue plan of care  []  Update interventions per flow sheet        []  Discharge due to:_  []  Other:_      Mahesh Call 4/5/2018  9:30 AM    Future Appointments  Date Time Provider Rossy Mason   4/16/2018 11:00 AM Mahesh Call [facility]   4/19/2018 11:00 AM Mahesh Call [facility]   4/25/2018 10:30 AM Mahesh Call [facility]   4/27/2018 10:00 AM Nolvia Soliman, PT [facility]   4/30/2018 10:30 AM Jeronimo Ramey [facility]   5/3/2018 10:00 AM N[facility]via Soliman PT [facility]   5/7/2018 10:30 AM Nolvia Soliman PT [facility]   5/10/2018 10:30 AM Sahra Eduardo, PT [facility]

## 2018-04-16 ENCOUNTER — HOSPITAL ENCOUNTER (OUTPATIENT)
Dept: PHYSICAL THERAPY | Age: 38
Discharge: HOME | End: 2018-04-16
Payer: MEDICAID

## 2018-04-16 PROCEDURE — 97110 THERAPEUTIC EXERCISES: CPT

## 2018-04-16 NOTE — PROGRESS NOTES
PT DAILY TREATMENT NOTE     Patient Name: Cecilia Castro  OLOI:  : 1980  [x]  Patient  Verified  Payor: BLUE CROSS MEDICAID / Plan: [facility] HEALTHKEEPERS PLUS / Product Type: Managed Care Medicaid /    In time:1116  Out time:12  Total Treatment Time (min): 44  Visit #: 22 of 20+ 8    Treatment Area: Low back pain [M54.5]    SUBJECTIVE  Pain Level (0-10 scale): 4  Any medication changes, allergies to medications, adverse drug reactions, diagnosis change, or new procedure performed?: [x] No    [] Yes (see summary sheet for update)  Subjective functional status/changes:   [] No changes reported  \"My back is really feeling better overall. I did some gardening this weekend. \"    OBJECTIVE      44 min Therapeutic Exercise:  [x] See flow sheet :review of body mechanics with floor reach and sweeping   Rationale: increase ROM, increase strength and increase proprioception to improve the patients ability to perform daily activities with decreased pain and symptom levels          With   [] TE   [] TA   [] neuro   [] other: Patient Education: [x] Review HEP    [] Progressed/Changed HEP based on:   [] positioning   [] body mechanics   [] transfers   [] heat/ice application    [] other:      Other Objective/Functional Measures:   No reports of pain during activities, residual tightness G/S region,      Pain Level (0-10 scale) post treatment: 3    ASSESSMENT/Changes in Function: Pain ranges 4-6/10 still in right lower back depending on activities such as heavy chores. Denies numbness and tingling in bilateral LE. Lumbar ROM WFL with slight increase in pain with lumbar extension. Lifting mechanics has improved as well with being able to self correct posture. Plan to continue PT for 4 more weeks or until the end of the month with pt self managing pain on own with HEP.      Patient will continue to benefit from skilled PT services to modify and progress therapeutic interventions, address functional mobility deficits, address ROM deficits, address strength deficits, analyze and address soft tissue restrictions, analyze and cue movement patterns, analyze and modify body mechanics/ergonomics and instruct in home and community integration to attain remaining goals. [x]  See Plan of Care  []  See progress note/recertification  []  See Discharge Summary         Progress towards goals / Updated goals:  Short Term Goals: To be accomplished in 3 weeks:                         5) Goal: Pt's LBP will decrease to 5/10 at worst and remain at midline of spine in order to better tolerate all ADLs. Status at initial evaluation: pain 10/10 at worst  Status at last progress note (11/09/17): max pain 7/10, progressing  Last PN: max pain 7-8/10,overall pain ranging from 5-7/10  Current: 5/10 max over weekend with increased pain with sweeping and mopping  progressing           Long Term Goals: To be accomplished in 8 weeks:                         1) Goal: Abolish LBP and radicular symptoms in order to return to normal function. Status at initial evaluation: LBP 10/10 at worst with intermittent bilateral feet numbness/tingling  Status at last progress note (11/09/17): max pain reduced to 7/10 with ambulation, sweeping, bending  Last PN: max pain 7-8/10 with ADL  Current:  no longer c/o bilateral LE numbness and tingling goal Met                            3) Goal: Pt will be able to demonstrate ability to self manage LBP to 2/10 at worst during all activities in order to return to normal function. Status at initial evaluation: LBP 10/10 at worst  Status at last progress note (11/09/17): not reassessed  Last PN: fair ability to self manage, pain up to 7-8/10 max  Current: ability to manage fairly however continued 4-7/10 pain progressing                           3) Updated goal as of 11/9/17: Patient using proper body mechanics with all material handling to minimize spinal loading and reduce pain.   Status at last progress note (11/09/17): habitual poor body mechanics with floor to waist lifting and sweeping motion  LAst PN: needs occasional verbal cues with floor to waist lift   Current: good mechanics, able to self correct goal Met    PLAN  [x]  Upgrade activities as tolerated     [x]  Continue plan of care with focus on progressive strengthening  []  Update interventions per flow sheet        []  Discharge due to:_  []  Other:_      Terrchao Ness, PT 4/16/2018  9:30 AM    Future Appointments  Date Time Provider Rossy Mason   4/19/2018 11:00 AM [facility]   4/25/2018 10:30 AM Sophia Quiroga [facility]   4/27/2018 10:00 AM Jovita Ness, PT [facility]   4/30/2018 10:30 AM [facility]   5/7/2018 10:30 AM Jovita Ness, PT [facility]   5/10/2018 10:30 AM Anselmo Jerome PT [facility] THE [facility]

## 2018-04-19 ENCOUNTER — HOSPITAL ENCOUNTER (OUTPATIENT)
Dept: PHYSICAL THERAPY | Age: 38
Discharge: HOME | End: 2018-04-19
Payer: MEDICAID

## 2018-04-19 PROCEDURE — 97110 THERAPEUTIC EXERCISES: CPT

## 2018-04-19 PROCEDURE — 97112 NEUROMUSCULAR REEDUCATION: CPT

## 2018-04-19 NOTE — PROGRESS NOTES
PT DAILY TREATMENT NOTE     Patient Name: Anila Rausch  SWMR:[id number]  : 1980  [x]  Patient  Verified  Payor: BLUE CROSS MEDICAID / Plan: [facility] HEALTHKEEPERS PLUS / Product Type: Managed Care Medicaid /    In time:11:05  Out time:11:46  Total Treatment Time (min): 41  Visit #: 23 of 28    Treatment Area: Low back pain [M54.5]    SUBJECTIVE  Pain Level (0-10 scale): 4  Any medication changes, allergies to medications, adverse drug reactions, diagnosis change, or new procedure performed?: [x] No    [] Yes (see summary sheet for update)  Subjective functional status/changes:   [] No changes reported  \"My back is getting better is my shoulder that really hurts now. \"    OBJECTIVE      31 min Therapeutic Exercise:  [x] See flow sheet :   Rationale: increase ROM and increase strength to improve the patients ability to perform daily activities with decreased pain and symptom levels     10 min Neuromuscular Re-education:  [x]  See flow sheet : Advanced Oov activities seated and supine to improve core strength and postural alignment   Rationale: increase strength, improve coordination, improve balance and increase proprioception  to improve the patients ability to perform daily activities with decreased pain and symptom levels             With   [] TE   [] TA   [] neuro   [] other: Patient Education: [x] Review HEP    [] Progressed/Changed HEP based on:   [] positioning   [] body mechanics   [] transfers   [] heat/ice application    [] other:      Other Objective/Functional Measures:   Increased instability on Oov when stabilizing with right LE  Challenged with sidelying hip abd     Pain Level (0-10 scale) post treatment: 3    ASSESSMENT/Changes in Function: Tolerated exercises well with decreased pain at end of session. Continues to demonstrate core and glut weakness especially on right side.      Patient will continue to benefit from skilled PT services to modify and progress therapeutic interventions, address functional mobility deficits, address strength deficits, analyze and cue movement patterns, analyze and modify body mechanics/ergonomics and instruct in home and community integration to attain remaining goals. []  See Plan of Care  []  See progress note/recertification  []  See Discharge Summary         Progress towards goals / Updated goals:  Short Term Goals: To be accomplished in 3 weeks:                         4) Goal: Pt's LBP will decrease to 5/10 at worst and remain at midline of spine in order to better tolerate all ADLs. Status at initial evaluation: pain 10/10 at worst  Status at last progress note (11/09/17): max pain 7/10, progressing  Last PN: max pain 7-8/10,overall pain ranging from 5-7/10  Current: 5/10 max over weekend with increased pain with sweeping and mopping  progressing           Long Term Goals: To be accomplished in 8 weeks:                         1) Goal: Abolish LBP and radicular symptoms in order to return to normal function. Status at initial evaluation: LBP 10/10 at worst with intermittent bilateral feet numbness/tingling  Status at last progress note (11/09/17): max pain reduced to 7/10 with ambulation, sweeping, bending  Last PN: max pain 7-8/10 with ADL  Current:  no longer c/o bilateral LE numbness and tingling goal Met                            2) Goal: Pt will be able to demonstrate ability to self manage LBP to 2/10 at worst during all activities in order to return to normal function. Status at initial evaluation: LBP 10/10 at worst  Status at last progress note (11/09/17): not reassessed  Last PN: fair ability to self manage, pain up to 7-8/10 max  Current: ability to manage fairly however continued 4-7/10 pain progressing                           3) Updated goal as of 11/9/17: Patient using proper body mechanics with all material handling to minimize spinal loading and reduce pain.   Status at last progress note (11/09/17): habitual poor body mechanics with floor to waist lifting and sweeping motion  LAst PN: needs occasional verbal cues with floor to waist lift   Current: good mechanics, able to self correct goal Met    PLAN  [x]  Upgrade activities as tolerated     [x]  Continue plan of care  []  Update interventions per flow sheet       []  Discharge due to:_  []  Other:_      Heidy Ramey 4/19/2018  11:08 AM    Future Appointments  Date Time Provider Rossy Mason   4/25/2018 10:30 AM [facility]   4/27/2018 10:00 AM [facility]   4/30/2018 10:30 AM [facility]   5/7/2018 10:30 AM [facility]ATELIN [facility]   5/10/2018 10:30 AM MICHAEL BurnsHPKATELIN THE [facility]

## 2018-04-25 ENCOUNTER — HOSPITAL ENCOUNTER (OUTPATIENT)
Dept: PHYSICAL THERAPY | Age: 38
Discharge: HOME | End: 2018-04-25
Payer: MEDICAID

## 2018-04-25 PROCEDURE — 97110 THERAPEUTIC EXERCISES: CPT

## 2018-04-25 NOTE — PROGRESS NOTES
[facility] [address] [place], [facility] [address]  Phone: [phone]      Fax:  [phone]    Discharge Summary      Patient name: Keshawn Anna     Start of Care: 17  Referral source: Flory Stroud MD    : 1980  Medical/Treatment Diagnosis: Bilateral wrist pain [M25.531, M25.532]  Onset Date:5   Prior Hospitalization: see medical history   Provider#: [id number]  Comorbidities:  depression, OA, tobacco use  Prior Level of Function:unrestricted  Medications: Verified on Patient Summary List    Visits from Start of Care: 10    Missed Visits: 7  Reporting Period : 17 to 3/6/18     Short Term Goals: To be accomplished in 2 weeks:                         5. Patient will report compliance with HEP at least 1x/day to aid in rehabilitation program.                        Status at IE: initial instruction                        Current: partial compliance goal Met                            2.Patient will display pain free AROM into 0-55 degrees wrist flexion to aid in completion of ADLs                        Status at IE: 0-45 B                        Current:0-60 both, goal met          Long Term Goals: To be accomplished in 4- 6 weeks:                        6. Patient will report compliance with HEP a least 3-4x/week to aid in rehabilitation/strengthening program.                        JPGGRE at IE: NA                        Current: compliance per pt report goal Met                            2.Patient will increase B  strength to 70lbs to aid in completion of ADLs.                       OLODHP at IE: 59.6 L, and 61.6 Rlbs                        Current:right 77# right, 70# left goal Met                            3.Patient will increase B UE strength to 5/5 throughout all planes to aid in completion of ADLs.                         FXPRCW at IE: 4/5 in forearm sup/pronation and ulnar/radial deviation                        Current: 4/5 right shoulder, 4+/5 left shoulder, 5/5 right wirist, 4/5 left supination progressing                            4.FLHYINQ will increase FOTO score to 51 points overall to demonstrate improvement in functional status                         Status at Eval: 41/100                                              Current: 47/100 progressing                             5. Ability to perform ADL with pain <or= 3/10 max for increased function and tolerances                        Current status: pain ranging from 0-8/10 with ADL duarte sweeping, mopping  [email or web address] 2/27/18: 5/10 max pain  Progressing     Assessment/ Summary of Care: Pt presented to therapy with signs/symptoms of non specific and persistent bilateral wrist pain. Pt attended 10 sessions focusing on improving ROM, wrist and scapular strength and pain with pt reporting 75% improvement since starting therapy. Continues with chronic pain with right > left wrist still needing cues to adjust posture and mechanics with lifting however improved  strength and ROM noted. Pt is ready to be discharged at this time due to progress towards goals with education on continued compliance with HEP.      RECOMMENDATIONS:  [x]Discontinue therapy: [x]Patient has reached or is progressing toward set goals      []Patient is non-compliant or has abdicated      []Due to lack of appreciable progress towards set goals    Maikol Dominguez Remesic 4/25/2018 11:38 AM

## 2018-04-25 NOTE — PROGRESS NOTES
PT DAILY TREATMENT NOTE     Patient Name: Li August  Date:2018  : 1980  [x]  Patient  Verified  Payor: BLUE CROSS MEDICAID / Plan: [facility] HEALTHKEEPERS PLUS / Product Type: Managed Care Medicaid /    In time:11:00  Out time:11:52  Total Treatment Time (min): 52  Visit #: 23 of 28    Treatment Area: Low back pain [M54.5]    SUBJECTIVE  Pain Level (0-10 scale): 4  Any medication changes, allergies to medications, adverse drug reactions, diagnosis change, or new procedure performed?: [x] No    [] Yes (see summary sheet for update)  Subjective functional status/changes:   [] No changes reported  \"My back hurts a little from walking to [employer] but I was also carrying a backpack. \"    OBJECTIVE      52 min Therapeutic Exercise:  [x] See flow sheet :   Rationale: increase ROM and increase strength to improve the patients ability to perform daily activities with decreased pain and symptom levels      With   [] TE   [] TA   [] neuro   [] other: Patient Education: [x] Review HEP    [] Progressed/Changed HEP based on:   [] positioning   [] body mechanics   [] transfers   [] heat/ice application    [] other:      Other Objective/Functional Measures:   Improved plank form  Challenged with SB activities     Pain Level (0-10 scale) post treatment: 4    ASSESSMENT/Changes in Function: Pt reports decreased overall pain with ADLs however max pain still 5-6/10 in back. Tolerated exercises well with reports of decreased pain post session. Educated on importance of body mechanics and posture with heavy household chores such as sweeping and vacuuming. Patient will continue to benefit from skilled PT services to modify and progress therapeutic interventions, address functional mobility deficits, address strength deficits, analyze and modify body mechanics/ergonomics, assess and modify postural abnormalities and instruct in home and community integration to attain remaining goals.      []  See Plan of Care  []  See progress note/recertification  []  See Discharge Summary         Progress towards goals / Updated goals:  Short Term Goals: To be accomplished in 3 weeks:                         7) Goal: Pt's LBP will decrease to 5/10 at worst and remain at midline of spine in order to better tolerate all ADLs. Status at initial evaluation: pain 10/10 at worst  Status at last progress note (11/09/17): max pain 7/10, progressing  Last PN: max pain 7-8/10,overall pain ranging from 5-7/10  Current: 5/10 max pain, pt reports increased ease with ADLs progressing           Long Term Goals: To be accomplished in 8 weeks:                         4) Goal: Abolish LBP and radicular symptoms in order to return to normal function. Status at initial evaluation: LBP 10/10 at worst with intermittent bilateral feet numbness/tingling  Status at last progress note (11/09/17): max pain reduced to 7/10 with ambulation, sweeping, bending  Last PN: max pain 7-8/10 with ADL  Current:  no longer c/o bilateral LE numbness and tingling goal Met                            9) Goal: Pt will be able to demonstrate ability to self manage LBP to 2/10 at worst during all activities in order to return to normal function. Status at initial evaluation: LBP 10/10 at worst  Status at last progress note (11/09/17): not reassessed  Last PN: fair ability to self manage, pain up to 7-8/10 max  Current: ability to manage fairly however continued 4-6/10 pain progressing                           3) Updated goal as of 11/9/17: Patient using proper body mechanics with all material handling to minimize spinal loading and reduce pain.   Status at last progress note (11/09/17): habitual poor body mechanics with floor to waist lifting and sweeping motion  LAst PN: needs occasional verbal cues with floor to waist lift   Current: good mechanics, able to self correct goal Met    PLAN  [x]  Upgrade activities as tolerated     [x]  Continue plan of care  []  Update interventions per flow sheet       []  Discharge due to:_  []  Other:_      Amy Main 4/25/2018  10:58 AM    Future Appointments  Date Time Provider Rossy Mason   4/25/2018 11:00 AM Sukhdev PERRY [facility]   4/27/2018 10:00 AM MICHAEL Daniel [facility]   4/30/2018 10:30 AM Sukhdev PERRY [facility]   5/7/2018 10:30 AM MICHAEL Salvador [facility]   5/10/2018 10:30 AM MICHAEL Calvert [facility]           \

## 2018-04-27 ENCOUNTER — APPOINTMENT (OUTPATIENT)
Dept: PHYSICAL THERAPY | Age: 38
End: 2018-04-27
Payer: MEDICAID

## 2018-04-30 ENCOUNTER — HOSPITAL ENCOUNTER (OUTPATIENT)
Dept: PHYSICAL THERAPY | Age: 38
Discharge: HOME | End: 2018-04-30
Payer: MEDICAID

## 2018-04-30 PROCEDURE — 97110 THERAPEUTIC EXERCISES: CPT

## 2018-04-30 PROCEDURE — 97112 NEUROMUSCULAR REEDUCATION: CPT

## 2018-04-30 NOTE — PROGRESS NOTES
[facility] [place] [address] [place], [address] [place]  Phone: [phone]      Fax:  [phone]    Discharge Summary      Patient name: Ismael Jaimes     Start of Care: 10/11/17  Referral source: Vanita Zamora MD    : 1980  Medical/Treatment Diagnosis: Low back pain [M54.5]  Onset Date:13  Prior Hospitalization: see medical history   Provider#: [id number]  Comorbidities: depression, T-spine compression fracture with Schmorl's nodes, OA, tobacco use  Prior Level of Function:no deficits, independent with ADLs  Medications: Verified on Patient Summary List    Visits from Start of Care: 24    Missed Visits: 12  Reporting Period : 10/11/17 to 18    Short Term Goals: To be accomplished in 3 weeks:                         1) Goal: Pt's LBP will decrease to 5/10 at worst and remain at midline of spine in order to better tolerate all ADLs. Status at initial evaluation: pain 10/10 at worst  Status at last progress note (17): max pain 7/10, progressing  Last PN: max pain 7-8/10,overall pain ranging from 5-7/10  Current: no pain over weekend in low back goal Met        Long Term Goals: To be accomplished in 8 weeks:                         8) Goal: Abolish LBP and radicular symptoms in order to return to normal function. Status at initial evaluation: LBP 10/10 at worst with intermittent bilateral feet numbness/tingling  Status at last progress note (17): max pain reduced to 7/10 with ambulation, sweeping, bending  Last PN: max pain 7-8/10 with ADL  Current:  no longer c/o bilateral LE numbness and tingling goal Met                            1) Goal: Pt will be able to demonstrate ability to self manage LBP to 2/10 at worst during all activities in order to return to normal function.   Status at initial evaluation: LBP 10/10 at worst  Status at last progress note (17): not reassessed  Last PN: fair ability to self manage, pain up to 7-8/10 max  Current: ability to manage fairly however continued 0-6/10 pain progressing                           3) Updated goal as of 11/9/17: Patient using proper body mechanics with all material handling to minimize spinal loading and reduce pain. Status at last progress note (11/09/17): habitual poor body mechanics with floor to waist lifting and sweeping motion  LAst PN: needs occasional verbal cues with floor to waist lift   Current: good mechanics, able to self correct goal Met          Assessment/ Summary of Care: Pt presented to therapy with chronic low back pain starting from fall off ladder resulting in thoracic compression fracture. Pt has attended 24 sessions focusing on improving mobility, flexibility, strength and lifting mechanics with pt reporting increased ease with self management at home with pain ranging 0-6/10 still in back. Pt presented to clinic today with reporting fall on right elbow with follow up appointment right after therapy. Pt later called with confirmed radial head fracture. Pt is to be discharged at this time contuinng with HEP at home when appropriate due to recent right elbow fracture.      RECOMMENDATIONS:  [x]Discontinue therapy: [x]Patient has reached or is progressing toward set goals      []Patient is non-compliant or has abdicated      []Due to lack of appreciable progress towards set goals    Altagracia Davis Remesic 4/30/2018 3:37 PM

## 2018-04-30 NOTE — PROGRESS NOTES
PT DAILY TREATMENT NOTE     Patient Name: Eva Reyes  Date:2018  : 1980  [x]  Patient  Verified  Payor: [employer]ICA[employer] / Plan: [facility] [employer] PLUS / Product Type: Managed Care Medicaid /    In time:10:40  Out time:11:15  Total Treatment Time (min): 35  Visit #: 24 of 38    Treatment Area: Low back pain [M54.5]    SUBJECTIVE  Pain Level (0-10 scale): 0  Any medication changes, allergies to medications, adverse drug reactions, diagnosis change, or new procedure performed?: [x] No    [] Yes (see summary sheet for update)  Subjective functional status/changes:   [] No changes reported  \"i fell on my elbow this weekend so it really hurts. I see the doctor today after this appointment. \"    OBJECTIVE      20 min Therapeutic Exercise:  [x] See flow sheet :   Rationale: increase ROM and increase strength to improve the patients ability to perform daily activities with decreased pain and symptom levels    15 min Neuromuscular Re-education:  [x]  See flow sheet : advanced activities on Oov sitting and supine, seated SB exercises   Rationale: increase strength, improve coordination, improve balance and increase proprioception  to improve the patients ability to perform daily activities with decreased pain and symptom levels          With   [] TE   [] TA   [] neuro   [] other: Patient Education: [x] Review HEP    [] Progressed/Changed HEP based on:   [] positioning   [] body mechanics   [] transfers   [] heat/ice application    [] other:      Other Objective/Functional Measures:   50 FOTO score     Pain Level (0-10 scale) post treatment: 0    ASSESSMENT/Changes in Function: Pt reports no back pain over the weekend with agreeable to DC after next 2 visits. FOTO score improved significantly suggesting subjective improvement as well. Pt reports possibly having surgery for wrists soon.      Patient will continue to benefit from skilled PT services to modify and progress therapeutic interventions, address functional mobility deficits, address strength deficits, analyze and modify body mechanics/ergonomics, assess and modify postural abnormalities and instruct in home and community integration to attain remaining goals. []  See Plan of Care  []  See progress note/recertification  []  See Discharge Summary         Progress towards goals / Updated goals:  Short Term Goals: To be accomplished in 3 weeks:                         1) Goal: Pt's LBP will decrease to 5/10 at worst and remain at midline of spine in order to better tolerate all ADLs. Status at initial evaluation: pain 10/10 at worst  Status at last progress note (11/09/17): max pain 7/10, progressing  Last PN: max pain 7-8/10,overall pain ranging from 5-7/10  Current: no pain over weekend in low back goal Met        Long Term Goals: To be accomplished in 8 weeks:                         1) Goal: Abolish LBP and radicular symptoms in order to return to normal function. Status at initial evaluation: LBP 10/10 at worst with intermittent bilateral feet numbness/tingling  Status at last progress note (11/09/17): max pain reduced to 7/10 with ambulation, sweeping, bending  Last PN: max pain 7-8/10 with ADL  Current:  no longer c/o bilateral LE numbness and tingling goal Met                            6) Goal: Pt will be able to demonstrate ability to self manage LBP to 2/10 at worst during all activities in order to return to normal function. Status at initial evaluation: LBP 10/10 at worst  Status at last progress note (11/09/17): not reassessed  Last PN: fair ability to self manage, pain up to 7-8/10 max  Current: ability to manage fairly however continued 4-6/10 pain progressing                           3) Updated goal as of 11/9/17: Patient using proper body mechanics with all material handling to minimize spinal loading and reduce pain.   Status at last progress note (11/09/17): habitual poor body mechanics with floor to waist lifting and sweeping motion  LAst PN: needs occasional verbal cues with floor to waist lift   Current: good mechanics, able to self correct goal Met       PLAN  [x]  Upgrade activities as tolerated     [x]  Continue plan of care  []  Update interventions per flow sheet       []  Discharge due to:_  []  Other:_      Stefanie Broderick 4/30/2018  10:55 AM    Future Appointments  Date Time Provider Rossy Mason   5/7/2018 10:30 AM Genie crowell, PT [place] [facility]   5/10/2018 10:30 AM MICHAEL Lamb [facility]

## 2018-05-03 ENCOUNTER — APPOINTMENT (OUTPATIENT)
Dept: PHYSICAL THERAPY | Age: 38
End: 2018-05-03

## 2018-05-07 ENCOUNTER — APPOINTMENT (OUTPATIENT)
Dept: PHYSICAL THERAPY | Age: 38
End: 2018-05-07

## 2018-05-10 ENCOUNTER — APPOINTMENT (OUTPATIENT)
Dept: PHYSICAL THERAPY | Age: 38
End: 2018-05-10